# Patient Record
Sex: FEMALE | Race: BLACK OR AFRICAN AMERICAN | Employment: FULL TIME | URBAN - METROPOLITAN AREA
[De-identification: names, ages, dates, MRNs, and addresses within clinical notes are randomized per-mention and may not be internally consistent; named-entity substitution may affect disease eponyms.]

---

## 2019-02-02 ENCOUNTER — ED HISTORICAL/CONVERTED ENCOUNTER (OUTPATIENT)
Dept: OTHER | Age: 29
End: 2019-02-02

## 2019-02-03 ENCOUNTER — OP HISTORICAL/CONVERTED ENCOUNTER (OUTPATIENT)
Dept: OTHER | Age: 29
End: 2019-02-03

## 2020-05-03 ENCOUNTER — ED HISTORICAL/CONVERTED ENCOUNTER (OUTPATIENT)
Dept: OTHER | Age: 30
End: 2020-05-03

## 2020-05-03 ENCOUNTER — IP HISTORICAL/CONVERTED ENCOUNTER (OUTPATIENT)
Dept: OTHER | Age: 30
End: 2020-05-03

## 2020-05-04 ENCOUNTER — IP HISTORICAL/CONVERTED ENCOUNTER (OUTPATIENT)
Dept: OTHER | Age: 30
End: 2020-05-04

## 2020-09-10 VITALS
WEIGHT: 215 LBS | HEART RATE: 82 BPM | BODY MASS INDEX: 33.74 KG/M2 | SYSTOLIC BLOOD PRESSURE: 116 MMHG | HEIGHT: 67 IN | DIASTOLIC BLOOD PRESSURE: 64 MMHG

## 2020-09-10 PROBLEM — O42.919 PRETERM PREMATURE RUPTURE OF MEMBRANES: Status: ACTIVE | Noted: 2020-09-10

## 2020-09-10 RX ORDER — METOCLOPRAMIDE 10 MG/1
10 TABLET ORAL
COMMUNITY
End: 2021-12-23

## 2020-09-10 RX ORDER — FLUCONAZOLE 150 MG/1
150 TABLET ORAL DAILY
COMMUNITY
End: 2021-12-23

## 2020-09-10 RX ORDER — OSELTAMIVIR PHOSPHATE 75 MG/1
CAPSULE ORAL
COMMUNITY
End: 2021-12-23

## 2020-09-10 RX ORDER — TERCONAZOLE 4 MG/G
1 CREAM VAGINAL
COMMUNITY
End: 2021-12-23

## 2020-09-10 RX ORDER — ASPIRIN 81 MG/1
TABLET ORAL DAILY
COMMUNITY
End: 2021-12-23

## 2020-09-10 RX ORDER — HYDROXYPROGESTERONE CAPROATE 250 MG/ML
275 INJECTION SUBCUTANEOUS
COMMUNITY
End: 2021-12-23

## 2020-09-10 RX ORDER — LANOLIN ALCOHOL/MO/W.PET/CERES
CREAM (GRAM) TOPICAL
COMMUNITY
End: 2021-12-23

## 2020-09-10 RX ORDER — FOLIC ACID/MULTIVIT,IRON,MINER 0.4MG-18MG
1 TABLET ORAL DAILY
COMMUNITY
End: 2021-12-23

## 2020-09-10 RX ORDER — METRONIDAZOLE 500 MG/1
TABLET ORAL 3 TIMES DAILY
COMMUNITY
End: 2021-12-23

## 2020-09-10 RX ORDER — NORGESTIMATE AND ETHINYL ESTRADIOL 0.25-0.035
1 KIT ORAL DAILY
COMMUNITY
End: 2021-12-23

## 2020-09-10 RX ORDER — CALCIUM CARBONATE/VITAMIN D3 600 MG-125
TABLET ORAL
COMMUNITY

## 2020-09-10 RX ORDER — IBUPROFEN 800 MG/1
TABLET ORAL
COMMUNITY
End: 2021-12-23

## 2020-09-10 RX ORDER — OXYCODONE AND ACETAMINOPHEN 5; 325 MG/1; MG/1
TABLET ORAL
COMMUNITY
End: 2021-12-23

## 2020-09-10 RX ORDER — IBUPROFEN 600 MG/1
TABLET ORAL
COMMUNITY
End: 2021-12-23

## 2021-10-03 ENCOUNTER — APPOINTMENT (OUTPATIENT)
Dept: ULTRASOUND IMAGING | Age: 31
End: 2021-10-03
Attending: STUDENT IN AN ORGANIZED HEALTH CARE EDUCATION/TRAINING PROGRAM
Payer: COMMERCIAL

## 2021-10-03 ENCOUNTER — HOSPITAL ENCOUNTER (EMERGENCY)
Age: 31
Discharge: HOME | End: 2021-10-03
Attending: STUDENT IN AN ORGANIZED HEALTH CARE EDUCATION/TRAINING PROGRAM
Payer: COMMERCIAL

## 2021-10-03 VITALS
WEIGHT: 230 LBS | TEMPERATURE: 99 F | SYSTOLIC BLOOD PRESSURE: 120 MMHG | HEART RATE: 66 BPM | BODY MASS INDEX: 36.1 KG/M2 | RESPIRATION RATE: 18 BRPM | OXYGEN SATURATION: 100 % | HEIGHT: 67 IN | DIASTOLIC BLOOD PRESSURE: 85 MMHG

## 2021-10-03 DIAGNOSIS — O20.0 THREATENED ABORTION: Primary | ICD-10-CM

## 2021-10-03 LAB
ABO + RH BLD: NORMAL
ANION GAP SERPL CALC-SCNC: 9 MMOL/L (ref 5–15)
APPEARANCE UR: ABNORMAL
BACTERIA URNS QL MICRO: NEGATIVE /HPF
BASOPHILS # BLD: 0.1 K/UL (ref 0–0.1)
BASOPHILS NFR BLD: 1 % (ref 0–1)
BILIRUB UR QL: NEGATIVE
BLOOD GROUP ANTIBODIES SERPL: NEGATIVE
BUN SERPL-MCNC: 9 MG/DL (ref 6–20)
BUN/CREAT SERPL: 14 (ref 12–20)
CA-I BLD-MCNC: 9 MG/DL (ref 8.5–10.1)
CHLORIDE SERPL-SCNC: 108 MMOL/L (ref 97–108)
CO2 SERPL-SCNC: 20 MMOL/L (ref 21–32)
COLOR UR: ABNORMAL
CREAT SERPL-MCNC: 0.63 MG/DL (ref 0.55–1.02)
DIFFERENTIAL METHOD BLD: NORMAL
EOSINOPHIL # BLD: 0.1 K/UL (ref 0–0.4)
EOSINOPHIL NFR BLD: 1 % (ref 0–7)
ERYTHROCYTE [DISTWIDTH] IN BLOOD BY AUTOMATED COUNT: 14.5 % (ref 11.5–14.5)
GLUCOSE SERPL-MCNC: 84 MG/DL (ref 65–100)
GLUCOSE UR STRIP.AUTO-MCNC: NEGATIVE MG/DL
HCG SERPL-ACNC: ABNORMAL MIU/ML (ref 0–6)
HCT VFR BLD AUTO: 36 % (ref 35–47)
HGB BLD-MCNC: 11.6 G/DL (ref 11.5–16)
HGB UR QL STRIP: ABNORMAL
IMM GRANULOCYTES # BLD AUTO: 0 K/UL (ref 0–0.04)
IMM GRANULOCYTES NFR BLD AUTO: 0 % (ref 0–0.5)
KETONES UR QL STRIP.AUTO: NEGATIVE MG/DL
LEUKOCYTE ESTERASE UR QL STRIP.AUTO: NEGATIVE
LYMPHOCYTES # BLD: 3.2 K/UL (ref 0.8–3.5)
LYMPHOCYTES NFR BLD: 34 % (ref 12–49)
MCH RBC QN AUTO: 27.2 PG (ref 26–34)
MCHC RBC AUTO-ENTMCNC: 32.2 G/DL (ref 30–36.5)
MCV RBC AUTO: 84.3 FL (ref 80–99)
MONOCYTES # BLD: 0.6 K/UL (ref 0–1)
MONOCYTES NFR BLD: 6 % (ref 5–13)
MUCOUS THREADS URNS QL MICRO: ABNORMAL /LPF
NEUTS SEG # BLD: 5.4 K/UL (ref 1.8–8)
NEUTS SEG NFR BLD: 58 % (ref 32–75)
NITRITE UR QL STRIP.AUTO: NEGATIVE
NRBC # BLD: 0 K/UL (ref 0–0.01)
NRBC BLD-RTO: 0 PER 100 WBC
PH UR STRIP: 7 [PH] (ref 5–8)
PLATELET # BLD AUTO: 241 K/UL (ref 150–400)
PMV BLD AUTO: 11.2 FL (ref 8.9–12.9)
POTASSIUM SERPL-SCNC: 4 MMOL/L (ref 3.5–5.1)
PROT UR STRIP-MCNC: NEGATIVE MG/DL
RBC # BLD AUTO: 4.27 M/UL (ref 3.8–5.2)
RBC #/AREA URNS HPF: >100 /HPF (ref 0–5)
SODIUM SERPL-SCNC: 137 MMOL/L (ref 136–145)
SP GR UR REFRACTOMETRY: 1.01 (ref 1–1.03)
SPECIMEN EXP DATE BLD: NORMAL
UROBILINOGEN UR QL STRIP.AUTO: 0.1 EU/DL (ref 0.1–1)
WBC # BLD AUTO: 9.4 K/UL (ref 3.6–11)
WBC URNS QL MICRO: ABNORMAL /HPF (ref 0–4)

## 2021-10-03 PROCEDURE — 81001 URINALYSIS AUTO W/SCOPE: CPT

## 2021-10-03 PROCEDURE — 80048 BASIC METABOLIC PNL TOTAL CA: CPT

## 2021-10-03 PROCEDURE — 84702 CHORIONIC GONADOTROPIN TEST: CPT

## 2021-10-03 PROCEDURE — 99283 EMERGENCY DEPT VISIT LOW MDM: CPT

## 2021-10-03 PROCEDURE — 76801 OB US < 14 WKS SINGLE FETUS: CPT

## 2021-10-03 PROCEDURE — 74011250636 HC RX REV CODE- 250/636: Performed by: STUDENT IN AN ORGANIZED HEALTH CARE EDUCATION/TRAINING PROGRAM

## 2021-10-03 PROCEDURE — 85025 COMPLETE CBC W/AUTO DIFF WBC: CPT

## 2021-10-03 PROCEDURE — 86901 BLOOD TYPING SEROLOGIC RH(D): CPT

## 2021-10-03 PROCEDURE — 36415 COLL VENOUS BLD VENIPUNCTURE: CPT

## 2021-10-03 RX ORDER — ACETAMINOPHEN 325 MG/1
650 TABLET ORAL
Qty: 20 TABLET | Refills: 0 | Status: SHIPPED | OUTPATIENT
Start: 2021-10-03 | End: 2021-12-23

## 2021-10-03 RX ADMIN — SODIUM CHLORIDE 1000 ML: 9 INJECTION, SOLUTION INTRAVENOUS at 22:42

## 2021-10-03 NOTE — Clinical Note
[facility] EMERGENCY DEPT  [facility] [place] [address]  [phone]    Work/School Note    Date: 10/3/2021    To Whom It May concern:    Selena Covington was seen and treated today in the emergency room by the following provider(s):  Attending Provider: Pradip Stapleton MD.      Selena Covington is excused from work/school on 10/03/21 and 10/04/21. She is medically clear to return to work/school on 10/5/2021.        Sincerely,          Sebastian Chamberlain MD

## 2021-10-03 NOTE — Clinical Note
[address] [facility] EMERGENCY DEPT  [address] [place] [address] [place] [address]  [phone]    Work/School Note    Date: 10/3/2021    To Whom It May concern:    Anastasiia Austin was seen and treated today in the emergency room by the following provider(s):  Attending Provider: Chika Miller MD.      Anastasiia Austin is excused from work/school on 10/3/2021 through 10/6/2021. She is medically clear to return to work/school on 10/7/2021.         Sincerely,          Selena North MD

## 2021-10-04 NOTE — DISCHARGE INSTRUCTIONS
Thank you! Thank you for allowing me to care for you in the emergency department. I sincerely hope that you are satisfied with your visit today. It is my goal to provide you with excellent care. Below you will find a list of your labs and imaging from your visit today. Should you have any questions regarding these results please do not hesitate to call the emergency department. Labs -     Recent Results (from the past 12 hour(s))   CBC WITH AUTOMATED DIFF    Collection Time: 10/03/21  9:10 PM   Result Value Ref Range    WBC 9.4 3.6 - 11.0 K/uL    RBC 4.27 3.80 - 5.20 M/uL    HGB 11.6 11.5 - 16.0 g/dL    HCT 36.0 35.0 - 47.0 %    MCV 84.3 80.0 - 99.0 FL    MCH 27.2 26.0 - 34.0 PG    MCHC 32.2 30.0 - 36.5 g/dL    RDW 14.5 11.5 - 14.5 %    PLATELET 164 378 - 263 K/uL    MPV 11.2 8.9 - 12.9 FL    NRBC 0.0 0.0  WBC    ABSOLUTE NRBC 0.00 0.00 - 0.01 K/uL    NEUTROPHILS 58 32 - 75 %    LYMPHOCYTES 34 12 - 49 %    MONOCYTES 6 5 - 13 %    EOSINOPHILS 1 0 - 7 %    BASOPHILS 1 0 - 1 %    IMMATURE GRANULOCYTES 0 0 - 0.5 %    ABS. NEUTROPHILS 5.4 1.8 - 8.0 K/UL    ABS. LYMPHOCYTES 3.2 0.8 - 3.5 K/UL    ABS. MONOCYTES 0.6 0.0 - 1.0 K/UL    ABS. EOSINOPHILS 0.1 0.0 - 0.4 K/UL    ABS. BASOPHILS 0.1 0.0 - 0.1 K/UL    ABS. IMM.  GRANS. 0.0 0.00 - 0.04 K/UL    DF AUTOMATED     METABOLIC PANEL, BASIC    Collection Time: 10/03/21  9:10 PM   Result Value Ref Range    Sodium 137 136 - 145 mmol/L    Potassium 4.0 3.5 - 5.1 mmol/L    Chloride 108 97 - 108 mmol/L    CO2 20 (L) 21 - 32 mmol/L    Anion gap 9 5 - 15 mmol/L    Glucose 84 65 - 100 mg/dL    BUN 9 6 - 20 mg/dL    Creatinine 0.63 0.55 - 1.02 mg/dL    BUN/Creatinine ratio 14 12 - 20      GFR est AA >60 >60 ml/min/1.73m2    GFR est non-AA >60 >60 ml/min/1.73m2    Calcium 9.0 8.5 - 10.1 mg/dL   URINALYSIS W/MICROSCOPIC    Collection Time: 10/03/21  9:10 PM   Result Value Ref Range    Color Yellow/Straw      Appearance Turbid (A) Clear      Specific gravity 1.015 1.003 - 1.030      pH (UA) 7.0 5.0 - 8.0      Protein Negative Negative mg/dL    Glucose Negative Negative mg/dL    Ketone Negative Negative mg/dL    Bilirubin Negative Negative      Blood Large (A) Negative      Urobilinogen 0.1 0.1 - 1.0 EU/dL    Nitrites Negative Negative      Leukocyte Esterase Negative Negative      WBC 5-10 0 - 4 /hpf    RBC >100 (H) 0 - 5 /hpf    Bacteria Negative Negative /hpf    Mucus Trace /lpf   BETA HCG, QT    Collection Time: 10/03/21  9:10 PM   Result Value Ref Range    Beta HCG, QT 41,716.0 (H) 0 - 6 mIU/mL   TYPE & SCREEN    Collection Time: 10/03/21  9:10 PM   Result Value Ref Range    Crossmatch Expiration 10/06/2021,2359     ABO/Rh(D) A Positive     Antibody screen Negative        Radiologic Studies -   US PREG UTS < 14 WKS SNGL   Final Result   Single live intrauterine gestation crown-rump length of 7 weeks 2   days. No evidence of implantation hemorrhage. CT Results  (Last 48 hours)      None          CXR Results  (Last 48 hours)      None               If you feel that you have not received excellent quality care or timely care, please ask to speak to the nurse manager. Please choose us in the future for your continued health care needs. ------------------------------------------------------------------------------------------------------------  The exam and treatment you received in the Emergency Department were for an urgent problem and are not intended as complete care. It is important that you follow-up with a doctor, nurse practitioner, or physician assistant to:  (1) confirm your diagnosis,  (2) re-evaluation of changes in your illness and treatment, and  (3) for ongoing care. If your symptoms become worse or you do not improve as expected and you are unable to reach your usual health care provider, you should return to the Emergency Department. We are available 24 hours a day.      Please take your discharge instructions with you when you go to your follow-up appointment. If you have any problem arranging a follow-up appointment, contact the Emergency Department immediately. If a prescription has been provided, please have it filled as soon as possible to prevent a delay in treatment. Read the entire medication instruction sheet provided to you by the pharmacy. If you have any questions or reservations about taking the medication due to side effects or interactions with other medications, please call your primary care physician or contact the ER to speak with the charge nurse. Make an appointment with your family doctor or the physician you were referred to for follow-up of this visit as instructed on your discharge paperwork, as this is a mandatory follow-up. Return to the ER if you are unable to be seen or if you are unable to be seen in a timely manner. If you have any problem arranging the follow-up visit, contact the Emergency Department immediately. The patient is a 39y Male complaining of headache.

## 2021-10-04 NOTE — ED NOTES
Pt discharged home with instructions to follow up with OBGYN for further evaluation. Pt verbalized understanding and stated she has an appt scheduled for obgyn. Pt ambulates out ED without assistant.

## 2021-10-04 NOTE — ED PROVIDER NOTES
EMERGENCY DEPARTMENT HISTORY AND PHYSICAL EXAM      Date: 10/3/2021  Patient Name: Pavel Sanchez    History of Presenting Illness     Chief Complaint   Patient presents with    Vaginal Bleeding       HPI: Pavel Sanchez, 35 y.o. female with no past medical history, , currently 8 weeks pregnant presenting for vaginal bleeding. Patient was in the shower when she noted bleeding from the vagina. The bleeding was moderate and was described as similar to her menstrual period. It was accompanied by clots. She denies any abdominal cramping. No lightheadedness or dizziness. No nausea or vomiting. No chest pain or shortness of breath. Has not had abortions in the past.  She only takes prenatal vitamins. No other complaints. Does not take any blood thinners. No trauma. Unknown blood type. PCP: None    Current Facility-Administered Medications   Medication Dose Route Frequency Provider Last Rate Last Admin    sodium chloride 0.9 % bolus infusion 1,000 mL  1,000 mL IntraVENous ONCE Pacheco Rudd MD 1,000 mL/hr at 10/03/21 2242 1,000 mL at 10/03/21 2242     Current Outpatient Medications   Medication Sig Dispense Refill    acetaminophen (TYLENOL) 325 mg tablet Take 2 Tablets by mouth every six (6) hours as needed for Pain. 20 Tablet 0    aspirin delayed-release 81 mg tablet Take  by mouth daily.  calcium-cholecalciferol, d3, (CALCIUM 600 + D) 600-125 mg-unit tab Take  by mouth.  ferrous sulfate 325 mg (65 mg iron) tablet Take  by mouth Daily (before breakfast).  fluconazole (DIFLUCAN) 150 mg tablet Take 150 mg by mouth daily. FDA advises cautious prescribing of oral fluconazole in pregnancy.  ibuprofen (MOTRIN) 600 mg tablet Take  by mouth every six (6) hours as needed for Pain.  ibuprofen (MOTRIN) 800 mg tablet Take  by mouth.  hydroxyprogesterone, PF, (Pat, PF,) 275 mg/1.1 mL atIn subcutaneous injection 275 mg by SubCUTAneous route every seven (7) days.       metoclopramide HCl (REGLAN) 10 mg tablet Take 10 mg by mouth Before breakfast, lunch, dinner and at bedtime.  metroNIDAZOLE (FLAGYL) 500 mg tablet Take  by mouth three (3) times daily.  oseltamivir (TAMIFLU) 75 mg capsule Take  by mouth.  oxyCODONE-acetaminophen (PERCOCET) 5-325 mg per tablet Take  by mouth every four (4) hours as needed for Pain.  prenatal vitamin (Prenatal) 28 mg iron- 800 mcg tab tablet Take 1 Tab by mouth daily.  norgestimate-ethinyl estradioL (Sprintec, 28,) 0.25-35 mg-mcg tab Take 1 Tab by mouth daily.  terconazole (TERAZOL 7) 0.4 % vaginal cream Insert 1 Applicator into vagina nightly. Medical History   I reviewed the medical, surgical, family, and social history, as well as allergies:    Past Medical History:  No past medical history on file. Past Surgical History:  No past surgical history on file. Family History:  No family history on file. Social History:  Social History     Tobacco Use    Smoking status: Not on file   Substance Use Topics    Alcohol use: Not on file    Drug use: Not on file       Allergies:  No Known Allergies    Review of Systems     Review of Systems   Constitutional: Negative for chills and fever. HENT: Negative for congestion, rhinorrhea and sore throat. Eyes: Negative. Respiratory: Negative for cough and shortness of breath. Cardiovascular: Negative for chest pain and leg swelling. Gastrointestinal: Negative for abdominal pain and vomiting. Endocrine: Negative. Genitourinary: Positive for vaginal bleeding. Negative for dysuria and hematuria. Musculoskeletal: Negative for back pain and myalgias. Skin: Negative for rash and wound. Allergic/Immunologic: Negative. Neurological: Negative for light-headedness and numbness. Hematological: Negative. Psychiatric/Behavioral: Negative for agitation and confusion.          Physical Exam and Vital Signs   Vital Signs - Reviewed the patient's vital signs. Patient Vitals for the past 12 hrs:   Temp Pulse Resp BP SpO2   10/03/21 2053 98.3 °F (36.8 °C) 85 18 120/85 100 %       Physical Exam:    GENERAL: awake, alert, cooperative, not in distress  HEENT:  * Pupils equal, EOMI  * Head atraumatic  CV:  * regular rhythm  * warm and perfused extremities bilaterally  PULMONARY: Good air movement, no wheezes or crackles  ABDOMEN: soft, not distended, no guarding, not tenderness to palpation  : No suprapubic tenderness  PELVIC EXAM  * External genitalia unremarkable without any rashes, abscesses, or lesions  * Speculum exam showed large clots that were cleared  * Vaginal wall mucosa is unremarkable without any lacerations. * Cervix visualized and is closed  * No cervical motion tenderness, adnexal tenderness or any masses  EXTREMITIES/BACK: warm and perfused, no tenderness, no edema  SKIN: no rashes or signs of trauma  NEURO:  * Speech clear  * Moves U&LE to command      Medical Decision Making and ED Course   - I am the first and primary provider for this patient and am the primary provider of record. - I reviewed the vital signs, available nursing notes, past medical history, past surgical history, family history and social history. - Initial assessment performed. The patients presenting problems have been discussed, and the staff are in agreement with the care plan formulated and outlined with them. I have encouraged them to ask questions as they arise throughout their visit. - Available medical records, nursing notes, old EKGs, and EMS run sheets (if patient was EMS transported) were reviewed    MDM:   Patient is a 35 y.o. female presenting for vaginal bleeding at 8 weeks gestation. Vitals reveal no abnormality and physical exam reveals large clots in the vagina with ectocervix.  Based on the history, physical exam, risk factors, and vitals signs, I favor the following differential diagnoses: Threatened , missed , completed , incomplete , anemia. We will test ABO for the need for RhoGam.  Will do ultrasound. Results     Labs:  Recent Results (from the past 12 hour(s))   CBC WITH AUTOMATED DIFF    Collection Time: 10/03/21  9:10 PM   Result Value Ref Range    WBC 9.4 3.6 - 11.0 K/uL    RBC 4.27 3.80 - 5.20 M/uL    HGB 11.6 11.5 - 16.0 g/dL    HCT 36.0 35.0 - 47.0 %    MCV 84.3 80.0 - 99.0 FL    MCH 27.2 26.0 - 34.0 PG    MCHC 32.2 30.0 - 36.5 g/dL    RDW 14.5 11.5 - 14.5 %    PLATELET 088 767 - 952 K/uL    MPV 11.2 8.9 - 12.9 FL    NRBC 0.0 0.0  WBC    ABSOLUTE NRBC 0.00 0.00 - 0.01 K/uL    NEUTROPHILS 58 32 - 75 %    LYMPHOCYTES 34 12 - 49 %    MONOCYTES 6 5 - 13 %    EOSINOPHILS 1 0 - 7 %    BASOPHILS 1 0 - 1 %    IMMATURE GRANULOCYTES 0 0 - 0.5 %    ABS. NEUTROPHILS 5.4 1.8 - 8.0 K/UL    ABS. LYMPHOCYTES 3.2 0.8 - 3.5 K/UL    ABS. MONOCYTES 0.6 0.0 - 1.0 K/UL    ABS. EOSINOPHILS 0.1 0.0 - 0.4 K/UL    ABS. BASOPHILS 0.1 0.0 - 0.1 K/UL    ABS. IMM.  GRANS. 0.0 0.00 - 0.04 K/UL    DF AUTOMATED     METABOLIC PANEL, BASIC    Collection Time: 10/03/21  9:10 PM   Result Value Ref Range    Sodium 137 136 - 145 mmol/L    Potassium 4.0 3.5 - 5.1 mmol/L    Chloride 108 97 - 108 mmol/L    CO2 20 (L) 21 - 32 mmol/L    Anion gap 9 5 - 15 mmol/L    Glucose 84 65 - 100 mg/dL    BUN 9 6 - 20 mg/dL    Creatinine 0.63 0.55 - 1.02 mg/dL    BUN/Creatinine ratio 14 12 - 20      GFR est AA >60 >60 ml/min/1.73m2    GFR est non-AA >60 >60 ml/min/1.73m2    Calcium 9.0 8.5 - 10.1 mg/dL   URINALYSIS W/MICROSCOPIC    Collection Time: 10/03/21  9:10 PM   Result Value Ref Range    Color Yellow/Straw      Appearance Turbid (A) Clear      Specific gravity 1.015 1.003 - 1.030      pH (UA) 7.0 5.0 - 8.0      Protein Negative Negative mg/dL    Glucose Negative Negative mg/dL    Ketone Negative Negative mg/dL    Bilirubin Negative Negative      Blood Large (A) Negative      Urobilinogen 0.1 0.1 - 1.0 EU/dL    Nitrites Negative Negative      Leukocyte Esterase Negative Negative      WBC 5-10 0 - 4 /hpf    RBC >100 (H) 0 - 5 /hpf    Bacteria Negative Negative /hpf    Mucus Trace /lpf   BETA HCG, QT    Collection Time: 10/03/21  9:10 PM   Result Value Ref Range    Beta HCG, QT 41,716.0 (H) 0 - 6 mIU/mL   TYPE & SCREEN    Collection Time: 10/03/21  9:10 PM   Result Value Ref Range    Crossmatch Expiration 10/06/2021,2359     ABO/Rh(D) A Positive     Antibody screen Negative        Radiologic Studies:  CT Results  (Last 48 hours)    None        CXR Results  (Last 48 hours)    None          Medications ordered:  Medications   sodium chloride 0.9 % bolus infusion 1,000 mL (1,000 mL IntraVENous New Bag 10/3/21 2242)        ED Course     ED Course:     ED Course as of Oct 03 2321   Sun Oct 03, 2021   2221 CBC does not show any evidence of acute process. Leukocytosis not present to suggest infection. Hemoglobin at baseline without evidence of acute anemia. Platelet count is normal.      [SS]   4638 Patient type A+, no need for Rhogam    [SS]   2256 Electrolytes are within range. Creatinine is not elevated more than baseline range making NOEL unlikely. BHCG is within normal range for current pregnancy dates. [SS]   2317 Urinalysis is within normal limits without any evidence of UTI: no bacteria, nitrites, or leukocyte esterase. No ketonemia to suggest dehydration. No glucosuria. [SS]   3217 Ultrasound shows a normal-appearing fetus with a normal heart rate. Since the cervix is closed and the patient is having bleeding, picture of threatened . Will discharge the patient with bedrest and close follow-up with OB/GYN.    [SS]      ED Course User Index  [SS] Kyle Orta MD         Final Disposition     Disposition: Condition stable  DC- Adult Discharges: All of the diagnostic tests were reviewed and questions answered. Diagnosis, care plan and treatment options were discussed. The patient understands the instructions and will follow up as directed.  The patients results have been reviewed with them. They have been counseled regarding their diagnosis. The patient verbally convey understanding and agreement of the signs, symptoms, diagnosis, treatment and prognosis and additionally agrees to follow up as recommended with their PCP in 24 - 48 hours. They also agree with the care-plan and convey that all of their questions have been answered. I have also put together some discharge instructions for them that include: 1) educational information regarding their diagnosis, 2) how to care for their diagnosis at home, as well a 3) list of reasons why they would want to return to the ED prior to their follow-up appointment, should their condition change. DISCHARGE PLAN:  1. Current Discharge Medication List      START taking these medications    Details   acetaminophen (TYLENOL) 325 mg tablet Take 2 Tablets by mouth every six (6) hours as needed for Pain. Qty: 20 Tablet, Refills: 0         CONTINUE these medications which have NOT CHANGED    Details   aspirin delayed-release 81 mg tablet Take  by mouth daily. calcium-cholecalciferol, d3, (CALCIUM 600 + D) 600-125 mg-unit tab Take  by mouth. ferrous sulfate 325 mg (65 mg iron) tablet Take  by mouth Daily (before breakfast). fluconazole (DIFLUCAN) 150 mg tablet Take 150 mg by mouth daily. FDA advises cautious prescribing of oral fluconazole in pregnancy. !! ibuprofen (MOTRIN) 600 mg tablet Take  by mouth every six (6) hours as needed for Pain. !! ibuprofen (MOTRIN) 800 mg tablet Take  by mouth. hydroxyprogesterone, PF, (Pat, PF,) 275 mg/1.1 mL atIn subcutaneous injection 275 mg by SubCUTAneous route every seven (7) days. metoclopramide HCl (REGLAN) 10 mg tablet Take 10 mg by mouth Before breakfast, lunch, dinner and at bedtime. metroNIDAZOLE (FLAGYL) 500 mg tablet Take  by mouth three (3) times daily. oseltamivir (TAMIFLU) 75 mg capsule Take  by mouth. oxyCODONE-acetaminophen (PERCOCET) 5-325 mg per tablet Take  by mouth every four (4) hours as needed for Pain.      prenatal vitamin (Prenatal) 28 mg iron- 800 mcg tab tablet Take 1 Tab by mouth daily. norgestimate-ethinyl estradioL (Sprintec, 28,) 0.25-35 mg-mcg tab Take 1 Tab by mouth daily. terconazole (TERAZOL 7) 0.4 % vaginal cream Insert 1 Applicator into vagina nightly. !! - Potential duplicate medications found. Please discuss with provider. 2.   Follow-up Information     Follow up With Specialties Details Why Contact Info    Alma Tabor MD Obstetrics & Gynecology Schedule an appointment as soon as possible for a visit in 1 day  [address] [place] [address]  [phone]      [address] [place] [address] DEPT Emergency Medicine Go to  If symptoms worsen [address] [facility] [address] [phone]        3. Return to ED if worse   4. Current Discharge Medication List      START taking these medications    Details   acetaminophen (TYLENOL) 325 mg tablet Take 2 Tablets by mouth every six (6) hours as needed for Pain. Qty: 20 Tablet, Refills: 0  Start date: 10/3/2021               Diagnosis     Clinical Impression:   1. Threatened         Attestations:    Dai Coronado MD    Please note that this dictation was completed with [facility], the computer voice recognition software. Quite often unanticipated grammatical, syntax, homophones, and other interpretive errors are inadvertently transcribed by the computer software. Please disregard these errors. Please excuse any errors that have escaped final proofreading. Thank you.

## 2021-10-12 ENCOUNTER — INITIAL PRENATAL (OUTPATIENT)
Dept: OBGYN CLINIC | Age: 31
End: 2021-10-12
Payer: COMMERCIAL

## 2021-10-12 VITALS
BODY MASS INDEX: 38.14 KG/M2 | HEART RATE: 72 BPM | WEIGHT: 243 LBS | DIASTOLIC BLOOD PRESSURE: 78 MMHG | SYSTOLIC BLOOD PRESSURE: 130 MMHG | OXYGEN SATURATION: 99 % | RESPIRATION RATE: 16 BRPM | HEIGHT: 67 IN

## 2021-10-12 DIAGNOSIS — N88.3 INCOMPETENT CERVIX: ICD-10-CM

## 2021-10-12 DIAGNOSIS — Z01.419 GYNECOLOGIC EXAM NORMAL: ICD-10-CM

## 2021-10-12 DIAGNOSIS — Z3A.08 8 WEEKS GESTATION OF PREGNANCY: ICD-10-CM

## 2021-10-12 DIAGNOSIS — N76.0 VAGINITIS AND VULVOVAGINITIS: ICD-10-CM

## 2021-10-12 DIAGNOSIS — O09.90 HIGH RISK PREGNANCY, ANTEPARTUM: Primary | ICD-10-CM

## 2021-10-12 DIAGNOSIS — O20.9 FIRST-TRIMESTER BLEEDING: ICD-10-CM

## 2021-10-12 PROCEDURE — 0500F INITIAL PRENATAL CARE VISIT: CPT | Performed by: OBSTETRICS & GYNECOLOGY

## 2021-10-12 PROCEDURE — 76801 OB US < 14 WKS SINGLE FETUS: CPT | Performed by: OBSTETRICS & GYNECOLOGY

## 2021-10-12 RX ORDER — TERCONAZOLE 4 MG/G
1 CREAM VAGINAL
Qty: 45 G | Refills: 0 | Status: SHIPPED | OUTPATIENT
Start: 2021-10-12 | End: 2021-10-25 | Stop reason: SDUPTHER

## 2021-10-12 NOTE — LETTER
NOTIFICATION RETURN TO WORK / SCHOOL    10/12/2021     Ms. Lazo  [address] [place] [address],[address] [address]      To Whom It May Concern:    Violet is currently under the care of [address] Patient is not to lift over 20 lbs during her pregnancy. Patient only allowed to work 40 hours per week. She will return to work/school on: 10/12/2021    If there are questions or concerns please have the patient contact our office.         Sincerely,      Wayne Weeks MD

## 2021-10-12 NOTE — PATIENT INSTRUCTIONS
Learning About Pregnancy  Your Care Instructions     Your health in the early weeks of your pregnancy is particularly important for your baby's health. Take good care of yourself. Anything you do that harms your body can also harm your baby. Make sure to go to all of your doctor appointments. Regular checkups will help keep you and your baby healthy. How can you care for yourself at home? Diet    · Eat a balanced diet. Make sure your diet includes plenty of beans, peas, and leafy green vegetables.     · Do not skip meals or go for many hours without eating. If you are nauseated, try to eat a small, healthy snack every 2 to 3 hours.     · Do not eat fish that has a high level of mercury, such as shark, swordfish, or mackerel. Do not eat more than one can of tuna each week.     · Drink plenty of fluids. If you have kidney, heart, or liver disease and have to limit fluids, talk with your doctor before you increase the amount of fluids you drink.     · Cut down on caffeine, such as coffee, tea, and cola.     · Do not drink alcohol, such as beer, wine, or hard liquor.     · Take a multivitamin that contains at least 400 micrograms (mcg) of folic acid to help prevent birth defects. Fortified cereal and whole wheat bread are good additional sources of folic acid.     · Increase the calcium in your diet. Try to drink a quart of skim milk each day. You may also take calcium supplements and choose foods such as cheese and yogurt. Lifestyle    · Make sure you go to your follow-up appointments.     · Get plenty of rest. You may be unusually tired while you are pregnant.     · Get at least 30 minutes of exercise on most days of the week. Walking is a good choice. If you have not exercised in the past, start out slowly. Take several short walks each day.     · Do not smoke. If you need help quitting, talk to your doctor about stop-smoking programs.  These can increase your chances of quitting for good.     · Do not touch cat feces or litter boxes. Also, wash your hands after you handle raw meat, and fully cook all meat before you eat it. Wear gloves when you work in the yard or garden, and wash your hands well when you are done. Cat feces, raw or undercooked meat, and contaminated dirt can cause an infection that may harm your baby or lead to a miscarriage.     · Do not use saunas or hot tubs. Raising your body temperature may harm your baby.     · Avoid chemical fumes, paint fumes, or poisons.     · Do not use illegal drugs, marijuana, or alcohol. Medicines    · Review all of your medicines with your doctor. Some of your routine medicines may need to be changed to protect your baby.     · Use acetaminophen (Tylenol) to relieve minor problems, such as a mild headache or backache or a mild fever with cold symptoms. Do not use nonsteroidal anti-inflammatory drugs (NSAIDs), such as ibuprofen (Advil, Motrin) or naproxen (Aleve), unless your doctor says it is okay.     · Do not take two or more pain medicines at the same time unless the doctor told you to. Many pain medicines have acetaminophen, which is Tylenol. Too much acetaminophen (Tylenol) can be harmful.     · Take your medicines exactly as prescribed. Call your doctor if you think you are having a problem with your medicine. To manage morning sickness    · If you feel sick when you first wake up, try eating a small snack (such as crackers) before you get out of bed. Allow some time to digest the snack, and then get out of bed slowly.     · Do not skip meals or go for long periods without eating. An empty stomach can make nausea worse.     · Eat small, frequent meals instead of three large meals each day.     · Drink plenty of fluids.     · Eat foods that are high in protein but low in fat.     · If you are taking iron supplements, ask your doctor if they are necessary.  Iron can make nausea worse.     · Avoid any smells, such as coffee, that make you feel sick.     · Get lots of rest. Morning sickness may be worse when you are tired. Follow-up care is a key part of your treatment and safety. Be sure to make and go to all appointments, and call your doctor if you are having problems. It's also a good idea to know your test results and keep a list of the medicines you take. Where can you learn more? Go to http://www.gray.com/  Enter Q816 in the search box to learn more about \"Learning About Pregnancy. \"  Current as of: June 16, 2021               Content Version: 13.0  © 6898-1091 [employer], [employer]. Care instructions adapted under license by [employer] (which disclaims liability or warranty for this information). If you have questions about a medical condition or this instruction, always ask your healthcare professional. [address] any warranty or liability for your use of this information.

## 2021-10-13 NOTE — PROGRESS NOTES
Jean Claude Larsen is a 35 y.o. female, , Patient's last menstrual period was 2021., who presents today for the following:  Chief Complaint   Patient presents with    Initial Prenatal Visit        No Known Allergies    Current Outpatient Medications   Medication Sig    terconazole (TERAZOL 7) 0.4 % vaginal cream Insert 1 Applicator into vagina nightly.  prenatal vitamin (Prenatal) 28 mg iron- 800 mcg tab tablet Take 1 Tab by mouth daily.  acetaminophen (TYLENOL) 325 mg tablet Take 2 Tablets by mouth every six (6) hours as needed for Pain. (Patient not taking: Reported on 10/12/2021)    aspirin delayed-release 81 mg tablet Take  by mouth daily. (Patient not taking: Reported on 10/12/2021)    calcium-cholecalciferol, d3, (CALCIUM 600 + D) 600-125 mg-unit tab Take  by mouth. (Patient not taking: Reported on 10/12/2021)    ferrous sulfate 325 mg (65 mg iron) tablet Take  by mouth Daily (before breakfast). (Patient not taking: Reported on 10/12/2021)    fluconazole (DIFLUCAN) 150 mg tablet Take 150 mg by mouth daily. FDA advises cautious prescribing of oral fluconazole in pregnancy. (Patient not taking: Reported on 10/12/2021)    ibuprofen (MOTRIN) 600 mg tablet Take  by mouth every six (6) hours as needed for Pain. (Patient not taking: Reported on 10/12/2021)    ibuprofen (MOTRIN) 800 mg tablet Take  by mouth. (Patient not taking: Reported on 10/12/2021)    hydroxyprogesterone, PF, (Pat, PF,) 275 mg/1.1 mL atIn subcutaneous injection 275 mg by SubCUTAneous route every seven (7) days. (Patient not taking: Reported on 10/12/2021)    metoclopramide HCl (REGLAN) 10 mg tablet Take 10 mg by mouth Before breakfast, lunch, dinner and at bedtime. (Patient not taking: Reported on 10/12/2021)    metroNIDAZOLE (FLAGYL) 500 mg tablet Take  by mouth three (3) times daily. (Patient not taking: Reported on 10/12/2021)    oseltamivir (TAMIFLU) 75 mg capsule Take  by mouth.  (Patient not taking: Reported on 10/12/2021)    oxyCODONE-acetaminophen (PERCOCET) 5-325 mg per tablet Take  by mouth every four (4) hours as needed for Pain. (Patient not taking: Reported on 10/12/2021)    norgestimate-ethinyl estradioL (Sprintec, 28,) 0.25-35 mg-mcg tab Take 1 Tab by mouth daily. (Patient not taking: Reported on 10/12/2021)    terconazole (TERAZOL 7) 0.4 % vaginal cream Insert 1 Applicator into vagina nightly. (Patient not taking: Reported on 10/12/2021)     No current facility-administered medications for this visit. History reviewed. No pertinent past medical history. History reviewed. No pertinent surgical history. History reviewed. No pertinent family history. Social History     Socioeconomic History    Marital status: SINGLE     Spouse name: Not on file    Number of children: Not on file    Years of education: Not on file    Highest education level: Not on file   Occupational History    Not on file   Tobacco Use    Smoking status: Never Smoker    Smokeless tobacco: Never Used   Substance and Sexual Activity    Alcohol use: Never    Drug use: Never    Sexual activity: Yes     Partners: Male     Birth control/protection: None   Other Topics Concern    Not on file   Social History Narrative    Not on file     Social Determinants of Health     Financial Resource Strain:     Difficulty of Paying Living Expenses:    Food Insecurity:     Worried About Running Out of Food in the Last Year:     [address] in the Last Year:    Transportation Needs:     Lack of Transportation (Medical):      Lack of Transportation (Non-Medical):    Physical Activity:     Days of Exercise per Week:     Minutes of Exercise per Session:    Stress:     Feeling of Stress :    Social Connections:     Frequency of Communication with Friends and Family:     Frequency of Social Gatherings with Friends and Family:     Attends [employer] Services:     Active Member of Clubs or Organizations:     Attends Club or Organization Meetings:     Marital Status:    Intimate Partner Violence:     Fear of Current or Ex-Partner:     Emotionally Abused:     Physically Abused:     Sexually Abused:          HPI  New ob visit  Doing well  Hx of incompetent cervix  Seen in the [facility] secondary to vaginal bleeding  Viable IUP on US    Review of Systems   Constitutional: Negative. Respiratory: Negative. Cardiovascular: Negative. Gastrointestinal: Negative. Genitourinary: Negative. Musculoskeletal: Negative. Skin: Negative. Neurological: Negative. Endo/Heme/Allergies: Negative. Psychiatric/Behavioral: Negative. All other systems reviewed and are negative. /78 (BP 1 Location: Right arm, BP Patient Position: Sitting)   Pulse 72   Resp 16   Ht 5' 7\" (1.702 m)   Wt 243 lb (110.2 kg)   LMP 08/08/2021   SpO2 99%   BMI 38.06 kg/m²    OBGyn Exam   PE:  Constitutional: General Appearance: healthy-appearing, well-nourished, well-developed, and well groomed. Psychiatric: Orientation: to time, place, and person. Mood and Affect: normal mood and affect and appropriate and active and alert. Abdomen: Auscultation/Inspection/Palpation: tenderness and mass and non-distended. Hernia: none palpated. Female Genitalia: Vulva: no masses, atrophy, or lesions. Bladder/Urethra: no urethral discharge or mass and normal meatus and bladder non distended. Vagina no tenderness, erythema, cystocele, rectocele, abnormal vaginal discharge, or vesicle(s) or ulcers. Cervix: no discharge or cervical motion tenderness and grossly normal.     Uterus: mobile, non-tender, and no uterine prolapse. Adnexa/Parametria: no adnexal tenderness.      1. High risk pregnancy, antepartum    - HEP B SURFACE AG  - T PALLIDUM SCREEN W/REFLEX  - RUBELLA AB, IGG  - TYPE & SCREEN  - HIV 1/2 AG/AB, 4TH GENERATION,W RFLX CONFIRM  - CBC WITH AUTOMATED DIFF  - HEMOGLOBIN FRACTIONATION  - THYROID CASCADE PROFILE  - CMV AB, IGG  - CMV AB, IGM  - VITAMIN D, 25 HYDROXY    2. Incompetent cervix    - REFERRAL TO MATERNAL FETAL MEDICINE    3. 8 weeks gestation of pregnancy      4. Gynecologic exam normal    - PAP IG, CT-NG-TV, APTIMA HPV AND RFX 90/33,34([id number])    5. Vaginitis and vulvovaginitis    - terconazole (TERAZOL 7) 0.4 % vaginal cream; Insert 1 Applicator into vagina nightly. Dispense: 45 g; Refill: 0    6. First-trimester bleeding    - AMB POC US OB < 14 WKS, 1ST GESTATION        Follow-up and Dispositions    · Return for ob.

## 2021-10-14 LAB
25(OH)D3+25(OH)D2 SERPL-MCNC: 10 NG/ML (ref 30–100)
ABO GROUP BLD: NORMAL
BASOPHILS # BLD AUTO: 0 X10E3/UL (ref 0–0.2)
BASOPHILS NFR BLD AUTO: 0 %
BLD GP AB SCN SERPL QL: NEGATIVE
CMV IGG SERPL IA-ACNC: 5.7 U/ML (ref 0–0.59)
CMV IGM SERPL IA-ACNC: <30 AU/ML (ref 0–29.9)
EOSINOPHIL # BLD AUTO: 0.1 X10E3/UL (ref 0–0.4)
EOSINOPHIL NFR BLD AUTO: 1 %
ERYTHROCYTE [DISTWIDTH] IN BLOOD BY AUTOMATED COUNT: 13.9 % (ref 11.7–15.4)
HBV SURFACE AG SERPL QL IA: NEGATIVE
HCT VFR BLD AUTO: 34.7 % (ref 34–46.6)
HGB A MFR BLD ELPH: 97.7 % (ref 96.4–98.8)
HGB A2 MFR BLD ELPH: 2.3 % (ref 1.8–3.2)
HGB BLD-MCNC: 11.2 G/DL (ref 11.1–15.9)
HGB F MFR BLD ELPH: 0 % (ref 0–2)
HGB FRACT BLD-IMP: NORMAL
HGB S MFR BLD ELPH: 0 %
HIV 1+2 AB+HIV1 P24 AG SERPL QL IA: NON REACTIVE
IMM GRANULOCYTES # BLD AUTO: 0 X10E3/UL (ref 0–0.1)
IMM GRANULOCYTES NFR BLD AUTO: 0 %
LYMPHOCYTES # BLD AUTO: 3.3 X10E3/UL (ref 0.7–3.1)
LYMPHOCYTES NFR BLD AUTO: 31 %
MCH RBC QN AUTO: 27.3 PG (ref 26.6–33)
MCHC RBC AUTO-ENTMCNC: 32.3 G/DL (ref 31.5–35.7)
MCV RBC AUTO: 84 FL (ref 79–97)
MONOCYTES # BLD AUTO: 0.7 X10E3/UL (ref 0.1–0.9)
MONOCYTES NFR BLD AUTO: 6 %
NEUTROPHILS # BLD AUTO: 6.6 X10E3/UL (ref 1.4–7)
NEUTROPHILS NFR BLD AUTO: 62 %
PLATELET # BLD AUTO: 232 X10E3/UL (ref 150–450)
RBC # BLD AUTO: 4.11 X10E6/UL (ref 3.77–5.28)
RH BLD: POSITIVE
RUBV IGG SERPL IA-ACNC: 3.15 INDEX
TREPONEMA PALLIDUM IGG+IGM AB [PRESENCE] IN SERUM OR PLASMA BY IMMUNOASSAY: NON REACTIVE
TSH SERPL DL<=0.005 MIU/L-ACNC: 1.35 UIU/ML (ref 0.45–4.5)
WBC # BLD AUTO: 10.7 X10E3/UL (ref 3.4–10.8)

## 2021-10-17 LAB
C TRACH RRNA CVX QL NAA+PROBE: NEGATIVE
CYTOLOGIST CVX/VAG CYTO: NORMAL
CYTOLOGY CVX/VAG DOC CYTO: NORMAL
CYTOLOGY CVX/VAG DOC THIN PREP: NORMAL
DX ICD CODE: NORMAL
HPV I/H RISK 4 DNA CVX QL PROBE+SIG AMP: NEGATIVE
Lab: NORMAL
N GONORRHOEA RRNA CVX QL NAA+PROBE: NEGATIVE
OTHER STN SPEC: NORMAL
STAT OF ADQ CVX/VAG CYTO-IMP: NORMAL
T VAGINALIS RRNA SPEC QL NAA+PROBE: NEGATIVE

## 2021-10-25 ENCOUNTER — TELEPHONE (OUTPATIENT)
Dept: OBGYN CLINIC | Age: 31
End: 2021-10-25

## 2021-10-25 DIAGNOSIS — N76.0 VAGINITIS AND VULVOVAGINITIS: ICD-10-CM

## 2021-10-25 RX ORDER — TERCONAZOLE 4 MG/G
1 CREAM VAGINAL
Qty: 45 G | Refills: 0 | Status: SHIPPED | OUTPATIENT
Start: 2021-10-25 | End: 2021-12-23

## 2021-10-25 NOTE — TELEPHONE ENCOUNTER
Patient called back stating [facility] told her they never received a prescription from Dr Jazmin Butler for her vaginal cream.  Advised her it shows on our end the prescription was received on 10/12/21. Prescription resubmitted.

## 2021-10-29 ENCOUNTER — APPOINTMENT (OUTPATIENT)
Dept: ULTRASOUND IMAGING | Age: 31
End: 2021-10-29
Attending: STUDENT IN AN ORGANIZED HEALTH CARE EDUCATION/TRAINING PROGRAM
Payer: COMMERCIAL

## 2021-10-29 ENCOUNTER — HOSPITAL ENCOUNTER (EMERGENCY)
Age: 31
Discharge: HOME | End: 2021-10-29
Attending: STUDENT IN AN ORGANIZED HEALTH CARE EDUCATION/TRAINING PROGRAM
Payer: COMMERCIAL

## 2021-10-29 VITALS
WEIGHT: 245 LBS | OXYGEN SATURATION: 100 % | BODY MASS INDEX: 39.37 KG/M2 | HEIGHT: 66 IN | SYSTOLIC BLOOD PRESSURE: 122 MMHG | TEMPERATURE: 98 F | DIASTOLIC BLOOD PRESSURE: 77 MMHG | HEART RATE: 77 BPM | RESPIRATION RATE: 12 BRPM

## 2021-10-29 DIAGNOSIS — O20.0 THREATENED MISCARRIAGE: ICD-10-CM

## 2021-10-29 DIAGNOSIS — N93.9 VAGINAL BLEEDING: Primary | ICD-10-CM

## 2021-10-29 LAB
ABO + RH BLD: NORMAL
ALBUMIN SERPL-MCNC: 3.3 G/DL (ref 3.5–5)
ALBUMIN/GLOB SERPL: 0.8 {RATIO} (ref 1.1–2.2)
ALP SERPL-CCNC: 60 U/L (ref 45–117)
ALT SERPL-CCNC: 18 U/L (ref 12–78)
ANION GAP SERPL CALC-SCNC: 7 MMOL/L (ref 5–15)
APPEARANCE UR: CLEAR
AST SERPL W P-5'-P-CCNC: 13 U/L (ref 15–37)
BACTERIA URNS QL MICRO: NEGATIVE /HPF
BASOPHILS # BLD: 0 K/UL (ref 0–0.1)
BASOPHILS NFR BLD: 0 % (ref 0–1)
BILIRUB SERPL-MCNC: 0.2 MG/DL (ref 0.2–1)
BILIRUB UR QL: NEGATIVE
BLOOD GROUP ANTIBODIES SERPL: NEGATIVE
BUN SERPL-MCNC: 10 MG/DL (ref 6–20)
BUN/CREAT SERPL: 16 (ref 12–20)
CA-I BLD-MCNC: 9.1 MG/DL (ref 8.5–10.1)
CHLORIDE SERPL-SCNC: 106 MMOL/L (ref 97–108)
CO2 SERPL-SCNC: 23 MMOL/L (ref 21–32)
COLOR UR: ABNORMAL
CREAT SERPL-MCNC: 0.62 MG/DL (ref 0.55–1.02)
DIFFERENTIAL METHOD BLD: NORMAL
EOSINOPHIL # BLD: 0.1 K/UL (ref 0–0.4)
EOSINOPHIL NFR BLD: 1 % (ref 0–7)
ERYTHROCYTE [DISTWIDTH] IN BLOOD BY AUTOMATED COUNT: 14.5 % (ref 11.5–14.5)
GLOBULIN SER CALC-MCNC: 4.1 G/DL (ref 2–4)
GLUCOSE SERPL-MCNC: 107 MG/DL (ref 65–100)
GLUCOSE UR STRIP.AUTO-MCNC: NEGATIVE MG/DL
HCG SERPL-ACNC: ABNORMAL MIU/ML (ref 0–6)
HCT VFR BLD AUTO: 35.2 % (ref 35–47)
HGB BLD-MCNC: 11.6 G/DL (ref 11.5–16)
HGB UR QL STRIP: ABNORMAL
IMM GRANULOCYTES # BLD AUTO: 0 K/UL (ref 0–0.04)
IMM GRANULOCYTES NFR BLD AUTO: 0 % (ref 0–0.5)
KETONES UR QL STRIP.AUTO: NEGATIVE MG/DL
LEUKOCYTE ESTERASE UR QL STRIP.AUTO: NEGATIVE
LYMPHOCYTES # BLD: 2.3 K/UL (ref 0.8–3.5)
LYMPHOCYTES NFR BLD: 25 % (ref 12–49)
MCH RBC QN AUTO: 27.3 PG (ref 26–34)
MCHC RBC AUTO-ENTMCNC: 33 G/DL (ref 30–36.5)
MCV RBC AUTO: 82.8 FL (ref 80–99)
MONOCYTES # BLD: 0.7 K/UL (ref 0–1)
MONOCYTES NFR BLD: 7 % (ref 5–13)
MUCOUS THREADS URNS QL MICRO: ABNORMAL /LPF
NEUTS SEG # BLD: 5.9 K/UL (ref 1.8–8)
NEUTS SEG NFR BLD: 67 % (ref 32–75)
NITRITE UR QL STRIP.AUTO: NEGATIVE
NRBC # BLD: 0 K/UL (ref 0–0.01)
NRBC BLD-RTO: 0 PER 100 WBC
PH UR STRIP: 7 [PH] (ref 5–8)
PLATELET # BLD AUTO: 228 K/UL (ref 150–400)
PMV BLD AUTO: 10.9 FL (ref 8.9–12.9)
POTASSIUM SERPL-SCNC: 3.8 MMOL/L (ref 3.5–5.1)
PROT SERPL-MCNC: 7.4 G/DL (ref 6.4–8.2)
PROT UR STRIP-MCNC: NEGATIVE MG/DL
RBC # BLD AUTO: 4.25 M/UL (ref 3.8–5.2)
RBC #/AREA URNS HPF: ABNORMAL /HPF (ref 0–5)
SODIUM SERPL-SCNC: 136 MMOL/L (ref 136–145)
SP GR UR REFRACTOMETRY: 1.01 (ref 1–1.03)
SPECIMEN EXP DATE BLD: NORMAL
UA: UC IF INDICATED,UAUC: ABNORMAL
UROBILINOGEN UR QL STRIP.AUTO: 0.1 EU/DL (ref 0.1–1)
WBC # BLD AUTO: 9 K/UL (ref 3.6–11)
WBC URNS QL MICRO: ABNORMAL /HPF (ref 0–4)

## 2021-10-29 PROCEDURE — 76815 OB US LIMITED FETUS(S): CPT

## 2021-10-29 PROCEDURE — 81001 URINALYSIS AUTO W/SCOPE: CPT

## 2021-10-29 PROCEDURE — 85025 COMPLETE CBC W/AUTO DIFF WBC: CPT

## 2021-10-29 PROCEDURE — 99283 EMERGENCY DEPT VISIT LOW MDM: CPT

## 2021-10-29 PROCEDURE — 36415 COLL VENOUS BLD VENIPUNCTURE: CPT

## 2021-10-29 PROCEDURE — 87086 URINE CULTURE/COLONY COUNT: CPT

## 2021-10-29 PROCEDURE — 86901 BLOOD TYPING SEROLOGIC RH(D): CPT

## 2021-10-29 PROCEDURE — 80053 COMPREHEN METABOLIC PANEL: CPT

## 2021-10-29 PROCEDURE — 84702 CHORIONIC GONADOTROPIN TEST: CPT

## 2021-10-29 NOTE — LETTER
[address] [place] [address] EMERGENCY DEPT  [facility] [place] [address]  [phone]    Work/School Note    Date: 10/29/2021    To Whom It May concern:    Antwan Monsalve was seen and treated today in the emergency room by the following provider(s):  Nurse Practitioner: Douglas Bautista NP. Antwan Monsalve may return to work on Karthik 10/31/2021.     Sincerely,          Briseyda Ulloa

## 2021-10-29 NOTE — ED PROVIDER NOTES
EMERGENCY DEPARTMENT HISTORY AND PHYSICAL EXAM      Date: 10/29/2021  Patient Name: Anastasiia Austin      History of Presenting Illness     Chief Complaint   Patient presents with    Threatened Miscarriage       History Provided By: Patient    HPI: Anastasiia Austin, 35 y.o. female with no chronic PMH of note presents emerged department for evaluation of vaginal bleeding. Bleeding started earlier today when she woke up around approximately 1 PM.  Spotting, light-colored blood, no clots. There is no associated abdominal pain, pelvic pain, nausea, vomiting, dysuria, or hematuria. There is no preceding vaginal discharge. There are no other complaints, changes, or physical findings at this time. PCP: None    Current Outpatient Medications   Medication Sig Dispense Refill    terconazole (TERAZOL 7) 0.4 % vaginal cream Insert 1 Applicator into vagina nightly. 45 g 0    acetaminophen (TYLENOL) 325 mg tablet Take 2 Tablets by mouth every six (6) hours as needed for Pain. (Patient not taking: Reported on 10/12/2021) 20 Tablet 0    aspirin delayed-release 81 mg tablet Take  by mouth daily. (Patient not taking: Reported on 10/12/2021)      calcium-cholecalciferol, d3, (CALCIUM 600 + D) 600-125 mg-unit tab Take  by mouth. (Patient not taking: Reported on 10/12/2021)      ferrous sulfate 325 mg (65 mg iron) tablet Take  by mouth Daily (before breakfast). (Patient not taking: Reported on 10/12/2021)      fluconazole (DIFLUCAN) 150 mg tablet Take 150 mg by mouth daily. FDA advises cautious prescribing of oral fluconazole in pregnancy. (Patient not taking: Reported on 10/12/2021)      ibuprofen (MOTRIN) 600 mg tablet Take  by mouth every six (6) hours as needed for Pain. (Patient not taking: Reported on 10/12/2021)      ibuprofen (MOTRIN) 800 mg tablet Take  by mouth.  (Patient not taking: Reported on 10/12/2021)      hydroxyprogesterone, PF, (Pat, PF,) 275 mg/1.1 mL atIn subcutaneous injection 275 mg by SubCUTAneous route every seven (7) days. (Patient not taking: Reported on 10/12/2021)      metoclopramide HCl (REGLAN) 10 mg tablet Take 10 mg by mouth Before breakfast, lunch, dinner and at bedtime. (Patient not taking: Reported on 10/12/2021)      metroNIDAZOLE (FLAGYL) 500 mg tablet Take  by mouth three (3) times daily. (Patient not taking: Reported on 10/12/2021)      oseltamivir (TAMIFLU) 75 mg capsule Take  by mouth. (Patient not taking: Reported on 10/12/2021)      oxyCODONE-acetaminophen (PERCOCET) 5-325 mg per tablet Take  by mouth every four (4) hours as needed for Pain. (Patient not taking: Reported on 10/12/2021)      prenatal vitamin (Prenatal) 28 mg iron- 800 mcg tab tablet Take 1 Tab by mouth daily.  norgestimate-ethinyl estradioL (Sprintec, 28,) 0.25-35 mg-mcg tab Take 1 Tab by mouth daily. (Patient not taking: Reported on 10/12/2021)      terconazole (TERAZOL 7) 0.4 % vaginal cream Insert 1 Applicator into vagina nightly. (Patient not taking: Reported on 10/12/2021)         Past History     Past Medical History:  History reviewed. No pertinent past medical history. Past Surgical History:  History reviewed. No pertinent surgical history. Family History:  History reviewed. No pertinent family history. Social History:  Social History     Tobacco Use    Smoking status: Never Smoker    Smokeless tobacco: Never Used   Vaping Use    Vaping Use: Never used   Substance Use Topics    Alcohol use: Never    Drug use: Never       Allergies:  No Known Allergies      Review of Systems     Review of Systems   Constitutional: Negative for activity change and fever. HENT: Negative for congestion and facial swelling. Eyes: Negative for discharge and visual disturbance. Respiratory: Negative for chest tightness and shortness of breath. Cardiovascular: Negative for chest pain and leg swelling. Gastrointestinal: Negative for abdominal pain and vomiting.    Genitourinary: Positive for vaginal bleeding. Negative for dysuria and flank pain. Musculoskeletal: Negative for back pain and gait problem. Skin: Negative for rash and wound. Neurological: Negative for dizziness and weakness. Physical Exam     Physical Exam  Vitals and nursing note reviewed. Exam conducted with a chaperone present. Constitutional:       General: She is not in acute distress. Appearance: Normal appearance. She is not ill-appearing, toxic-appearing or diaphoretic. HENT:      Head: Normocephalic and atraumatic. Mouth/Throat:      Mouth: Mucous membranes are moist.      Pharynx: Oropharynx is clear. Eyes:      Extraocular Movements: Extraocular movements intact. Conjunctiva/sclera: Conjunctivae normal.   Cardiovascular:      Rate and Rhythm: Normal rate and regular rhythm. Pulmonary:      Effort: Pulmonary effort is normal.      Breath sounds: Normal breath sounds. Abdominal:      General: Abdomen is flat. Palpations: Abdomen is soft. Tenderness: There is no abdominal tenderness. Genitourinary:     General: Normal vulva. Exam position: Supine. Labia:         Right: No rash. Left: No rash. Vagina: Normal.      Cervix: Normal.   Musculoskeletal:         General: No tenderness or deformity. Cervical back: Normal range of motion and neck supple. Skin:     General: Skin is warm and dry. Neurological:      General: No focal deficit present. Mental Status: She is alert and oriented to person, place, and time.          Lab and Diagnostic Study Results     Labs -     Recent Results (from the past 12 hour(s))   CBC WITH AUTOMATED DIFF    Collection Time: 10/29/21  3:50 PM   Result Value Ref Range    WBC 9.0 3.6 - 11.0 K/uL    RBC 4.25 3.80 - 5.20 M/uL    HGB 11.6 11.5 - 16.0 g/dL    HCT 35.2 35.0 - 47.0 %    MCV 82.8 80.0 - 99.0 FL    MCH 27.3 26.0 - 34.0 PG    MCHC 33.0 30.0 - 36.5 g/dL    RDW 14.5 11.5 - 14.5 %    PLATELET 049 872 - 967 K/uL    MPV 10.9 8.9 - 12.9 FL    NRBC 0.0 0.0  WBC    ABSOLUTE NRBC 0.00 0.00 - 0.01 K/uL    NEUTROPHILS 67 32 - 75 %    LYMPHOCYTES 25 12 - 49 %    MONOCYTES 7 5 - 13 %    EOSINOPHILS 1 0 - 7 %    BASOPHILS 0 0 - 1 %    IMMATURE GRANULOCYTES 0 0 - 0.5 %    ABS. NEUTROPHILS 5.9 1.8 - 8.0 K/UL    ABS. LYMPHOCYTES 2.3 0.8 - 3.5 K/UL    ABS. MONOCYTES 0.7 0.0 - 1.0 K/UL    ABS. EOSINOPHILS 0.1 0.0 - 0.4 K/UL    ABS. BASOPHILS 0.0 0.0 - 0.1 K/UL    ABS. IMM. GRANS. 0.0 0.00 - 0.04 K/UL    DF AUTOMATED     METABOLIC PANEL, COMPREHENSIVE    Collection Time: 10/29/21  3:50 PM   Result Value Ref Range    Sodium 136 136 - 145 mmol/L    Potassium 3.8 3.5 - 5.1 mmol/L    Chloride 106 97 - 108 mmol/L    CO2 23 21 - 32 mmol/L    Anion gap 7 5 - 15 mmol/L    Glucose 107 (H) 65 - 100 mg/dL    BUN 10 6 - 20 mg/dL    Creatinine 0.62 0.55 - 1.02 mg/dL    BUN/Creatinine ratio 16 12 - 20      GFR est AA >60 >60 ml/min/1.73m2    GFR est non-AA >60 >60 ml/min/1.73m2    Calcium 9.1 8.5 - 10.1 mg/dL    Bilirubin, total 0.2 0.2 - 1.0 mg/dL    AST (SGOT) 13 (L) 15 - 37 U/L    ALT (SGPT) 18 12 - 78 U/L    Alk.  phosphatase 60 45 - 117 U/L    Protein, total 7.4 6.4 - 8.2 g/dL    Albumin 3.3 (L) 3.5 - 5.0 g/dL    Globulin 4.1 (H) 2.0 - 4.0 g/dL    A-G Ratio 0.8 (L) 1.1 - 2.2     TYPE & SCREEN    Collection Time: 10/29/21  3:50 PM   Result Value Ref Range    Crossmatch Expiration 11/01/2021,2359     ABO/Rh(D) A Positive     Antibody screen Negative    BETA HCG, QT    Collection Time: 10/29/21  3:50 PM   Result Value Ref Range    Beta HCG, QT 58,327.8 (H) 0 - 6 mIU/mL   URINALYSIS W/ REFLEX CULTURE    Collection Time: 10/29/21  4:30 PM    Specimen: Urine   Result Value Ref Range    Color Yellow/Straw      Appearance Clear Clear      Specific gravity 1.010 1.003 - 1.030      pH (UA) 7.0 5.0 - 8.0      Protein Negative Negative mg/dL    Glucose Negative Negative mg/dL    Ketone Negative Negative mg/dL    Bilirubin Negative Negative      Blood Small (A) Negative      Urobilinogen 0.1 0.1 - 1.0 EU/dL    Nitrites Negative Negative      Leukocyte Esterase Negative Negative      UA:UC IF INDICATED Urine Culture Ordered (A) Culture not indicated by UA result      WBC 0-5 0 - 4 /hpf    RBC 0-5 0 - 5 /hpf    Bacteria Negative Negative /hpf    Mucus Trace /lpf       Radiologic Studies -   [unfilled]  CT Results  (Last 48 hours)    None        CXR Results  (Last 48 hours)    None          Medical Decision Making and ED Course   - I am the first and primary provider for this patient AND AM THE PRIMARY PROVIDER OF RECORD. - I reviewed the vital signs, available nursing notes, past medical history, past surgical history, family history and social history. - Initial assessment performed. The patients presenting problems have been discussed, and the staff are in agreement with the care plan formulated and outlined with them. I have encouraged them to ask questions as they arise throughout their visit. Vital Signs-Reviewed the patient's vital signs. Patient Vitals for the past 12 hrs:   Temp Pulse Resp BP SpO2   10/29/21 1806 98 °F (36.7 °C) 77 12 122/77 100 %   10/29/21 1518 98.5 °F (36.9 °C) 85 18 131/87 98 %         Records Reviewed: Nursing Notes    Provider Notes (Medical Decision Making):   Patient presenting with vaginal bleeding. She disclosed to triage that she has some cramping but when I asked her she said she had some feeling of that she is in a pass gas and when she did her symptoms has resolved. Her main issue is evaluating the started earlier today. Small amount of bright red blood without any pain or any additional symptoms. CBC was unremarkable. Blood group is A+, chemistry within normal, urinalysis showed small blood but otherwise unremarkable. Transvaginal ultrasound pelvic ultrasound showed a single live intrauterine gestation at 11 weeks and 1 day without evidence of hemorrhage. Cervical os was closed.   Thus, discussed with the patient her finding and that the possibility of miscarriage cannot be ruled out and that she should continue pelvic rest till she sees her OB/GYN for reevaluation. And she verbalized understanding. Stressed on importance of coming back if she has any worsening of her symptoms or any other concerning symptoms. Disposition     Disposition: Condition stable  DC- Adult Discharges: All of the diagnostic tests were reviewed and questions answered. Diagnosis, care plan and treatment options were discussed. The patient understands the instructions and will follow up as directed. The patients results have been reviewed with them. They have been counseled regarding their diagnosis. The patient verbally convey understanding and agreement of the signs, symptoms, diagnosis, treatment and prognosis and additionally agrees to follow up as recommended with their PCP in 24 - 48 hours. They also agree with the care-plan and convey that all of their questions have been answered. I have also put together some discharge instructions for them that include: 1) educational information regarding their diagnosis, 2) how to care for their diagnosis at home, as well a 3) list of reasons why they would want to return to the ED prior to their follow-up appointment, should their condition change. Discharged      DISCHARGE PLAN:  1. Current Discharge Medication List      CONTINUE these medications which have NOT CHANGED    Details   !! terconazole (TERAZOL 7) 0.4 % vaginal cream Insert 1 Applicator into vagina nightly. Qty: 45 g, Refills: 0    Associated Diagnoses: Vaginitis and vulvovaginitis      acetaminophen (TYLENOL) 325 mg tablet Take 2 Tablets by mouth every six (6) hours as needed for Pain. Qty: 20 Tablet, Refills: 0      aspirin delayed-release 81 mg tablet Take  by mouth daily. calcium-cholecalciferol, d3, (CALCIUM 600 + D) 600-125 mg-unit tab Take  by mouth.       ferrous sulfate 325 mg (65 mg iron) tablet Take  by mouth Daily (before breakfast). fluconazole (DIFLUCAN) 150 mg tablet Take 150 mg by mouth daily. FDA advises cautious prescribing of oral fluconazole in pregnancy. !! ibuprofen (MOTRIN) 600 mg tablet Take  by mouth every six (6) hours as needed for Pain. !! ibuprofen (MOTRIN) 800 mg tablet Take  by mouth. hydroxyprogesterone, PF, (Pat, PF,) 275 mg/1.1 mL atIn subcutaneous injection 275 mg by SubCUTAneous route every seven (7) days. metoclopramide HCl (REGLAN) 10 mg tablet Take 10 mg by mouth Before breakfast, lunch, dinner and at bedtime. metroNIDAZOLE (FLAGYL) 500 mg tablet Take  by mouth three (3) times daily. oseltamivir (TAMIFLU) 75 mg capsule Take  by mouth. oxyCODONE-acetaminophen (PERCOCET) 5-325 mg per tablet Take  by mouth every four (4) hours as needed for Pain.      prenatal vitamin (Prenatal) 28 mg iron- 800 mcg tab tablet Take 1 Tab by mouth daily. norgestimate-ethinyl estradioL (Sprintec, 28,) 0.25-35 mg-mcg tab Take 1 Tab by mouth daily. !! terconazole (TERAZOL 7) 0.4 % vaginal cream Insert 1 Applicator into vagina nightly. !! - Potential duplicate medications found. Please discuss with provider. 2.   Follow-up Information     Follow up With Specialties Details Why Contact Info    Abimael Burt MD Obstetrics & Gynecology Schedule an appointment as soon as possible for a visit  OB/GYN, for ER follow up [address] [place] [address]  [phone]      [facility] EMERGENCY DEPT Emergency Medicine Go to  As needed, If symptoms worsen [facility] [place] [address]  [phone]        3. Return to ED if worse   4. Discharge Medication List as of 10/29/2021  6:09 PM          Diagnosis     Clinical Impression:   1. Vaginal bleeding    2. Threatened miscarriage        Attestations:     Heidi Golden MD    Please note that this dictation was completed with Plat[employer], the computer voice recognition software. Quite often unanticipated grammatical, syntax, homophones, and other interpretive errors are inadvertently transcribed by the computer software. Please disregard these errors. Please excuse any errors that have escaped final proofreading. Thank you.

## 2021-10-29 NOTE — ED TRIAGE NOTES
GCS 15 pt stated that she is 11 weeks pregnant; stated that this am she started to have what felt like gas bubbles/cramping and then started spotting

## 2021-10-29 NOTE — DISCHARGE INSTRUCTIONS
Thank you! Thank you for allowing me to care for you in the emergency department. I sincerely hope that you are satisfied with your visit today. It is my goal to provide you with excellent care. Below you will find a list of your labs and imaging from your visit today. Should you have any questions regarding these results please do not hesitate to call the emergency department. Labs -     Recent Results (from the past 12 hour(s))   CBC WITH AUTOMATED DIFF    Collection Time: 10/29/21  3:50 PM   Result Value Ref Range    WBC 9.0 3.6 - 11.0 K/uL    RBC 4.25 3.80 - 5.20 M/uL    HGB 11.6 11.5 - 16.0 g/dL    HCT 35.2 35.0 - 47.0 %    MCV 82.8 80.0 - 99.0 FL    MCH 27.3 26.0 - 34.0 PG    MCHC 33.0 30.0 - 36.5 g/dL    RDW 14.5 11.5 - 14.5 %    PLATELET 803 090 - 382 K/uL    MPV 10.9 8.9 - 12.9 FL    NRBC 0.0 0.0  WBC    ABSOLUTE NRBC 0.00 0.00 - 0.01 K/uL    NEUTROPHILS 67 32 - 75 %    LYMPHOCYTES 25 12 - 49 %    MONOCYTES 7 5 - 13 %    EOSINOPHILS 1 0 - 7 %    BASOPHILS 0 0 - 1 %    IMMATURE GRANULOCYTES 0 0 - 0.5 %    ABS. NEUTROPHILS 5.9 1.8 - 8.0 K/UL    ABS. LYMPHOCYTES 2.3 0.8 - 3.5 K/UL    ABS. MONOCYTES 0.7 0.0 - 1.0 K/UL    ABS. EOSINOPHILS 0.1 0.0 - 0.4 K/UL    ABS. BASOPHILS 0.0 0.0 - 0.1 K/UL    ABS. IMM. GRANS. 0.0 0.00 - 0.04 K/UL    DF AUTOMATED     METABOLIC PANEL, COMPREHENSIVE    Collection Time: 10/29/21  3:50 PM   Result Value Ref Range    Sodium 136 136 - 145 mmol/L    Potassium 3.8 3.5 - 5.1 mmol/L    Chloride 106 97 - 108 mmol/L    CO2 23 21 - 32 mmol/L    Anion gap 7 5 - 15 mmol/L    Glucose 107 (H) 65 - 100 mg/dL    BUN 10 6 - 20 mg/dL    Creatinine 0.62 0.55 - 1.02 mg/dL    BUN/Creatinine ratio 16 12 - 20      GFR est AA >60 >60 ml/min/1.73m2    GFR est non-AA >60 >60 ml/min/1.73m2    Calcium 9.1 8.5 - 10.1 mg/dL    Bilirubin, total 0.2 0.2 - 1.0 mg/dL    AST (SGOT) 13 (L) 15 - 37 U/L    ALT (SGPT) 18 12 - 78 U/L    Alk.  phosphatase 60 45 - 117 U/L    Protein, total 7.4 6.4 - 8.2 g/dL    Albumin 3.3 (L) 3.5 - 5.0 g/dL    Globulin 4.1 (H) 2.0 - 4.0 g/dL    A-G Ratio 0.8 (L) 1.1 - 2.2     TYPE & SCREEN    Collection Time: 10/29/21  3:50 PM   Result Value Ref Range    Crossmatch Expiration 11/01/2021,2359     ABO/Rh(D) A Positive     Antibody screen Negative    URINALYSIS W/ REFLEX CULTURE    Collection Time: 10/29/21  4:30 PM    Specimen: Urine   Result Value Ref Range    Color Yellow/Straw      Appearance Clear Clear      Specific gravity 1.010 1.003 - 1.030      pH (UA) 7.0 5.0 - 8.0      Protein Negative Negative mg/dL    Glucose Negative Negative mg/dL    Ketone Negative Negative mg/dL    Bilirubin Negative Negative      Blood Small (A) Negative      Urobilinogen 0.1 0.1 - 1.0 EU/dL    Nitrites Negative Negative      Leukocyte Esterase Negative Negative      UA:UC IF INDICATED Urine Culture Ordered (A) Culture not indicated by UA result      WBC 0-5 0 - 4 /hpf    RBC 0-5 0 - 5 /hpf    Bacteria Negative Negative /hpf    Mucus Trace /lpf       Radiologic Studies -   US PREG UTS LTD   Final Result   1. Single live intrauterine gestation 11 weeks 1 day, estimated date delivery   5/19/2022. This is concordant with LMP dating of 11 weeks 5 days (LMP provided   8/8/2021). This is concordant with prior ultrasound dating. 2. No evidence of implantation hemorrhage. CT Results  (Last 48 hours)      None          CXR Results  (Last 48 hours)      None               If you feel that you have not received excellent quality care or timely care, please ask to speak to the nurse manager. Please choose us in the future for your continued health care needs. ------------------------------------------------------------------------------------------------------------  The exam and treatment you received in the Emergency Department were for an urgent problem and are not intended as complete care.  It is important that you follow-up with a doctor, nurse practitioner, or physician assistant to:  (1) confirm your diagnosis,  (2) re-evaluation of changes in your illness and treatment, and  (3) for ongoing care. If your symptoms become worse or you do not improve as expected and you are unable to reach your usual health care provider, you should return to the Emergency Department. We are available 24 hours a day. Please take your discharge instructions with you when you go to your follow-up appointment. If you have any problem arranging a follow-up appointment, contact the Emergency Department immediately. If a prescription has been provided, please have it filled as soon as possible to prevent a delay in treatment. Read the entire medication instruction sheet provided to you by the pharmacy. If you have any questions or reservations about taking the medication due to side effects or interactions with other medications, please call your primary care physician or contact the ER to speak with the charge nurse. Make an appointment with your family doctor or the physician you were referred to for follow-up of this visit as instructed on your discharge paperwork, as this is a mandatory follow-up. Return to the ER if you are unable to be seen or if you are unable to be seen in a timely manner. If you have any problem arranging the follow-up visit, contact the Emergency Department immediately.

## 2021-10-31 LAB
BACTERIA SPEC CULT: NORMAL
SPECIAL REQUESTS,SREQ: NORMAL

## 2021-11-16 ENCOUNTER — ROUTINE PRENATAL (OUTPATIENT)
Dept: OBGYN CLINIC | Age: 31
End: 2021-11-16
Payer: COMMERCIAL

## 2021-11-16 VITALS
HEIGHT: 66 IN | WEIGHT: 245 LBS | RESPIRATION RATE: 16 BRPM | SYSTOLIC BLOOD PRESSURE: 124 MMHG | BODY MASS INDEX: 39.37 KG/M2 | DIASTOLIC BLOOD PRESSURE: 76 MMHG | HEART RATE: 91 BPM | OXYGEN SATURATION: 97 %

## 2021-11-16 DIAGNOSIS — Z36.9 UNSPECIFIED ANTENATAL SCREENING: ICD-10-CM

## 2021-11-16 DIAGNOSIS — O09.90 HIGH RISK PREGNANCY, ANTEPARTUM: Primary | ICD-10-CM

## 2021-11-16 DIAGNOSIS — N88.3 INCOMPETENT CERVIX: ICD-10-CM

## 2021-11-16 DIAGNOSIS — Z3A.13 13 WEEKS GESTATION OF PREGNANCY: ICD-10-CM

## 2021-11-16 PROCEDURE — 0502F SUBSEQUENT PRENATAL CARE: CPT | Performed by: OBSTETRICS & GYNECOLOGY

## 2021-11-16 NOTE — LETTER
NOTIFICATION RETURN TO WORK / SCHOOL    11/16/2021     Ms. Lazo  [address] [address]      To Whom It May Concern:    Violet is currently under the care of [address] [facility] [place]. Patient is to be on light duty with no lifting of any kind. She will return to work/school on: 11/17/2021    If there are questions or concerns please have the patient contact our office.         Sincerely,      Pacheco Coley MD

## 2021-11-21 LAB
ABOUT THE TEST: NORMAL
FET TS 13 RISK PLAS.CFDNA QL: NEGATIVE
FETAL FRACTION: NORMAL
FETAL SEX: NORMAL
GA EST FROM CONCEPTION DATE: NORMAL D
GESTATIONAL AGE >=9W: YES
LAB DIRECTOR NAME PROVIDER: NORMAL
LAB DIRECTOR NAME PROVIDER: NORMAL
LIMITATIONS OF THE TEST: NORMAL
NEGATIVE PREDICTIVE VALUE: NORMAL
NOTE: NORMAL
PERFORMANCE CHARACTERISTICS: NORMAL
POSITIVE PREDICTIVE VALUE: NORMAL
REF LAB TEST METHOD: NORMAL
REFERENCES: NORMAL
RESULT, 451942: NEGATIVE
TEST PERFORMANCE INFO SPEC: NORMAL
TRISOMY 18 (EDWARDS SYNDROME): NEGATIVE
TRISOMY 21 (DOWN SYNDROME): NEGATIVE

## 2021-11-23 NOTE — PROGRESS NOTES
JENNIFER visit  Doing well   No pain no bleeding   Cerclage per MFM  Viable iup on ultrasound  precautions

## 2021-12-07 ENCOUNTER — TELEPHONE (OUTPATIENT)
Dept: OBGYN CLINIC | Age: 31
End: 2021-12-07

## 2021-12-07 NOTE — TELEPHONE ENCOUNTER
Patient called stating she needs the work note from 11/16/21 faxed to her employer stating she has a lift restriction. Patient will fax the work note to the patient's employer once she provides a fax number.

## 2021-12-08 ENCOUNTER — TELEPHONE (OUTPATIENT)
Dept: OBGYN CLINIC | Age: 31
End: 2021-12-08

## 2021-12-08 NOTE — TELEPHONE ENCOUNTER
Patient called stating her job is giving her a hard time with regards to the note stating she is not to lift anything. Note was refaxed to them on yesterday. Spoke with Ferdinand Sandhu in the [employer] at [facility] and verified the patient is not to lift anything. She states a determination will have to be made as to whether they can accommodate the patient.

## 2021-12-13 ENCOUNTER — ROUTINE PRENATAL (OUTPATIENT)
Dept: OBGYN CLINIC | Age: 31
End: 2021-12-13
Payer: MEDICAID

## 2021-12-13 VITALS
BODY MASS INDEX: 40.34 KG/M2 | HEIGHT: 66 IN | DIASTOLIC BLOOD PRESSURE: 77 MMHG | OXYGEN SATURATION: 98 % | RESPIRATION RATE: 16 BRPM | SYSTOLIC BLOOD PRESSURE: 120 MMHG | HEART RATE: 96 BPM | WEIGHT: 251 LBS

## 2021-12-13 DIAGNOSIS — N88.3 INCOMPETENT CERVIX: ICD-10-CM

## 2021-12-13 DIAGNOSIS — Z3A.17 17 WEEKS GESTATION OF PREGNANCY: ICD-10-CM

## 2021-12-13 DIAGNOSIS — O09.90 HIGH RISK PREGNANCY, ANTEPARTUM: Primary | ICD-10-CM

## 2021-12-13 DIAGNOSIS — Z36.9 UNSPECIFIED ANTENATAL SCREENING: ICD-10-CM

## 2021-12-13 PROCEDURE — 0502F SUBSEQUENT PRENATAL CARE: CPT | Performed by: OBSTETRICS & GYNECOLOGY

## 2021-12-13 NOTE — PATIENT INSTRUCTIONS
Counting Your Baby's Kicks: Care Instructions  Overview     Counting your baby's kicks is one way your doctor can tell that your baby is healthy. Most women--especially in a first pregnancy--feel their baby move for the first time between 16 and 22 weeks. The movement may feel like flutters rather than kicks. Your baby may move more at certain times of the day. When you are active, you may notice less kicking than when you are resting. At your prenatal visits, your doctor will ask whether the baby is active. In your last trimester, your doctor may ask you to count the number of times you feel your baby move. Follow-up care is a key part of your treatment and safety. Be sure to make and go to all appointments, and call your doctor if you are having problems. It's also a good idea to know your test results and keep a list of the medicines you take. How do you count fetal kicks? · A common method of checking your baby's movement is to note the length of time it takes to count ten movements (such as kicks, flutters, or rolls). · Pick your baby's most active time of day to count. This may be any time from morning to evening. · If you don't feel 10 movements in an hour, have something to eat or drink and count for another hour. If you don't feel at least 10 movements in the 2-hour period, call your doctor. When should you call for help? Call your doctor now or seek immediate medical care if:    · You noticed that your baby has stopped moving or is moving much less than normal.   Watch closely for changes in your health, and be sure to contact your doctor if you have any problems. Where can you learn more? Go to http://www.gray.com/  Enter [id number] in the search box to learn more about \"Counting Your Baby's Kicks: Care Instructions. \"  Current as of: June 16, 2021               Content Version: 13.0  © 7131-2217 Healthwise, Incorporated.    Care instructions adapted under license by [employer] (which disclaims liability or warranty for this information). If you have questions about a medical condition or this instruction, always ask your healthcare professional. [address] any warranty or liability for your use of this information.

## 2021-12-13 NOTE — PROGRESS NOTES
SUBJECTIVE:  Krystin Mora presents today for return prenatal visit. She complains of: no unusual complaints. Patient's medical, obstetrical, social, and family histories; medications; and lab results have been reviewed. REVIEW OF SYSTEMS: A comprehensive review of systems was negative except for that written in the HPI.    17w3d    OBJECTIVE:   Visit Vitals  /77   Pulse 96   Resp 16   Ht 5' 6\" (1.676 m)   Wt 251 lb (113.9 kg)   LMP 08/08/2021   SpO2 98%   BMI 40.51 kg/m²      Refer to Prenatal Physical for exam findings    ASSESSMENT/PLAN:  Prenatal Education: 2nd trimester topics:  pregnancy danger signs. AFP today,  [facility] appt 12/15  Patient/guardian understands and agrees with the plan of care.

## 2021-12-21 ENCOUNTER — HOSPITAL ENCOUNTER (INPATIENT)
Age: 31
LOS: 1 days | Discharge: HOME | End: 2021-12-23
Attending: EMERGENCY MEDICINE | Admitting: OBSTETRICS & GYNECOLOGY
Payer: COMMERCIAL

## 2021-12-21 ENCOUNTER — APPOINTMENT (OUTPATIENT)
Dept: ULTRASOUND IMAGING | Age: 31
End: 2021-12-21
Attending: EMERGENCY MEDICINE
Payer: COMMERCIAL

## 2021-12-21 DIAGNOSIS — O03.9 INEVITABLE COMPLETE MISCARRIAGE WITHOUT COMPLICATION: Primary | ICD-10-CM

## 2021-12-21 DIAGNOSIS — Z3A.18 18 WEEKS GESTATION OF PREGNANCY: ICD-10-CM

## 2021-12-21 PROBLEM — Z34.90 PREGNANT: Status: ACTIVE | Noted: 2021-12-21

## 2021-12-21 LAB
ABO + RH BLD: NORMAL
ALBUMIN SERPL-MCNC: 2.9 G/DL (ref 3.5–5)
ALBUMIN/GLOB SERPL: 0.7 {RATIO} (ref 1.1–2.2)
ALP SERPL-CCNC: 81 U/L (ref 45–117)
ALT SERPL-CCNC: 19 U/L (ref 12–78)
ANION GAP SERPL CALC-SCNC: 8 MMOL/L (ref 5–15)
AST SERPL W P-5'-P-CCNC: 12 U/L (ref 15–37)
BASOPHILS # BLD: 0 K/UL (ref 0–0.1)
BASOPHILS NFR BLD: 0 % (ref 0–1)
BILIRUB SERPL-MCNC: 0.2 MG/DL (ref 0.2–1)
BLOOD GROUP ANTIBODIES SERPL: NEGATIVE
BUN SERPL-MCNC: 6 MG/DL (ref 6–20)
BUN/CREAT SERPL: 9 (ref 12–20)
CA-I BLD-MCNC: 9.2 MG/DL (ref 8.5–10.1)
CHLORIDE SERPL-SCNC: 105 MMOL/L (ref 97–108)
CO2 SERPL-SCNC: 25 MMOL/L (ref 21–32)
CREAT SERPL-MCNC: 0.69 MG/DL (ref 0.55–1.02)
DIFFERENTIAL METHOD BLD: ABNORMAL
EOSINOPHIL # BLD: 0.2 K/UL (ref 0–0.4)
EOSINOPHIL NFR BLD: 2 % (ref 0–7)
ERYTHROCYTE [DISTWIDTH] IN BLOOD BY AUTOMATED COUNT: 14.1 % (ref 11.5–14.5)
GLOBULIN SER CALC-MCNC: 4.4 G/DL (ref 2–4)
GLUCOSE SERPL-MCNC: 90 MG/DL (ref 65–100)
HCT VFR BLD AUTO: 34.6 % (ref 35–47)
HGB BLD-MCNC: 11.4 G/DL (ref 11.5–16)
IMM GRANULOCYTES # BLD AUTO: 0.1 K/UL (ref 0–0.04)
IMM GRANULOCYTES NFR BLD AUTO: 1 % (ref 0–0.5)
LYMPHOCYTES # BLD: 2.8 K/UL (ref 0.8–3.5)
LYMPHOCYTES NFR BLD: 22 % (ref 12–49)
MCH RBC QN AUTO: 27.9 PG (ref 26–34)
MCHC RBC AUTO-ENTMCNC: 32.9 G/DL (ref 30–36.5)
MCV RBC AUTO: 84.6 FL (ref 80–99)
MONOCYTES # BLD: 0.9 K/UL (ref 0–1)
MONOCYTES NFR BLD: 7 % (ref 5–13)
NEUTS SEG # BLD: 8.9 K/UL (ref 1.8–8)
NEUTS SEG NFR BLD: 68 % (ref 32–75)
NRBC # BLD: 0 K/UL (ref 0–0.01)
NRBC BLD-RTO: 0 PER 100 WBC
PLATELET # BLD AUTO: 241 K/UL (ref 150–400)
PMV BLD AUTO: 10.8 FL (ref 8.9–12.9)
POTASSIUM SERPL-SCNC: 3.3 MMOL/L (ref 3.5–5.1)
PROT SERPL-MCNC: 7.3 G/DL (ref 6.4–8.2)
RBC # BLD AUTO: 4.09 M/UL (ref 3.8–5.2)
SODIUM SERPL-SCNC: 138 MMOL/L (ref 136–145)
SPECIMEN EXP DATE BLD: NORMAL
WBC # BLD AUTO: 13 K/UL (ref 3.6–11)

## 2021-12-21 PROCEDURE — 74011250636 HC RX REV CODE- 250/636

## 2021-12-21 PROCEDURE — 76815 OB US LIMITED FETUS(S): CPT

## 2021-12-21 PROCEDURE — 0UCC7ZZ EXTIRPATION OF MATTER FROM CERVIX, VIA NATURAL OR ARTIFICIAL OPENING: ICD-10-PCS | Performed by: OBSTETRICS & GYNECOLOGY

## 2021-12-21 PROCEDURE — 86901 BLOOD TYPING SEROLOGIC RH(D): CPT

## 2021-12-21 PROCEDURE — 3E033VJ INTRODUCTION OF OTHER HORMONE INTO PERIPHERAL VEIN, PERCUTANEOUS APPROACH: ICD-10-PCS | Performed by: OBSTETRICS & GYNECOLOGY

## 2021-12-21 PROCEDURE — 74011250636 HC RX REV CODE- 250/636: Performed by: OBSTETRICS & GYNECOLOGY

## 2021-12-21 PROCEDURE — 36415 COLL VENOUS BLD VENIPUNCTURE: CPT

## 2021-12-21 PROCEDURE — 74011000258 HC RX REV CODE- 258: Performed by: OBSTETRICS & GYNECOLOGY

## 2021-12-21 PROCEDURE — 85025 COMPLETE CBC W/AUTO DIFF WBC: CPT

## 2021-12-21 PROCEDURE — 80053 COMPREHEN METABOLIC PANEL: CPT

## 2021-12-21 PROCEDURE — 99283 EMERGENCY DEPT VISIT LOW MDM: CPT

## 2021-12-21 RX ORDER — BUTORPHANOL TARTRATE 1 MG/ML
1 INJECTION INTRAMUSCULAR; INTRAVENOUS
Status: DISCONTINUED | OUTPATIENT
Start: 2021-12-21 | End: 2021-12-23

## 2021-12-21 RX ORDER — PENICILLIN G 3000000 [IU]/50ML
3 INJECTION, SOLUTION INTRAVENOUS EVERY 4 HOURS
Status: DISCONTINUED | OUTPATIENT
Start: 2021-12-22 | End: 2021-12-23

## 2021-12-21 RX ORDER — BUTORPHANOL TARTRATE 1 MG/ML
INJECTION INTRAMUSCULAR; INTRAVENOUS
Status: COMPLETED
Start: 2021-12-21 | End: 2021-12-21

## 2021-12-21 RX ORDER — SODIUM CHLORIDE, SODIUM LACTATE, POTASSIUM CHLORIDE, CALCIUM CHLORIDE 600; 310; 30; 20 MG/100ML; MG/100ML; MG/100ML; MG/100ML
125 INJECTION, SOLUTION INTRAVENOUS CONTINUOUS
Status: DISCONTINUED | OUTPATIENT
Start: 2021-12-21 | End: 2021-12-23

## 2021-12-21 RX ADMIN — SODIUM CHLORIDE, POTASSIUM CHLORIDE, SODIUM LACTATE AND CALCIUM CHLORIDE 125 ML/HR: 600; 310; 30; 20 INJECTION, SOLUTION INTRAVENOUS at 22:25

## 2021-12-21 RX ADMIN — BUTORPHANOL TARTRATE 1 MG: 1 INJECTION, SOLUTION INTRAMUSCULAR; INTRAVENOUS at 22:36

## 2021-12-21 RX ADMIN — SODIUM CHLORIDE 5 MILLION UNITS: 900 INJECTION INTRAVENOUS at 22:36

## 2021-12-22 ENCOUNTER — APPOINTMENT (OUTPATIENT)
Dept: ULTRASOUND IMAGING | Age: 31
End: 2021-12-22
Attending: OBSTETRICS & GYNECOLOGY
Payer: COMMERCIAL

## 2021-12-22 PROBLEM — N88.3 CERVICAL INCOMPETENCE: Status: ACTIVE | Noted: 2021-12-22

## 2021-12-22 PROBLEM — O42.111 PRETERM PREMATURE RUPTURE OF MEMBRANES (PPROM) WITH ONSET OF LABOR AFTER 24 HOURS OF RUPTURE IN FIRST TRIMESTER, ANTEPARTUM: Status: ACTIVE | Noted: 2021-12-22

## 2021-12-22 PROBLEM — Z34.90 PREGNANT: Status: RESOLVED | Noted: 2021-12-21 | Resolved: 2021-12-22

## 2021-12-22 PROBLEM — O02.1 FETAL DEMISE BEFORE 20 WEEKS WITH RETENTION OF DEAD FETUS: Status: ACTIVE | Noted: 2021-12-22

## 2021-12-22 PROBLEM — Z3A.18 18 WEEKS GESTATION OF PREGNANCY: Status: ACTIVE | Noted: 2021-12-22

## 2021-12-22 LAB
ALBUMIN SERPL-MCNC: 2.4 G/DL (ref 3.5–5)
ALBUMIN/GLOB SERPL: 0.7 {RATIO} (ref 1.1–2.2)
ALP SERPL-CCNC: 65 U/L (ref 45–117)
ALT SERPL-CCNC: 15 U/L (ref 12–78)
ANION GAP SERPL CALC-SCNC: 8 MMOL/L (ref 5–15)
AST SERPL W P-5'-P-CCNC: 10 U/L (ref 15–37)
BASOPHILS # BLD: 0.1 K/UL (ref 0–0.1)
BASOPHILS NFR BLD: 0 % (ref 0–1)
BILIRUB SERPL-MCNC: 0.2 MG/DL (ref 0.2–1)
BUN SERPL-MCNC: 5 MG/DL (ref 6–20)
BUN/CREAT SERPL: 10 (ref 12–20)
CA-I BLD-MCNC: 8.7 MG/DL (ref 8.5–10.1)
CHLORIDE SERPL-SCNC: 108 MMOL/L (ref 97–108)
CO2 SERPL-SCNC: 23 MMOL/L (ref 21–32)
COVID-19 RAPID TEST, COVR: NOT DETECTED
CREAT SERPL-MCNC: 0.5 MG/DL (ref 0.55–1.02)
DIFFERENTIAL METHOD BLD: ABNORMAL
EOSINOPHIL # BLD: 0.2 K/UL (ref 0–0.4)
EOSINOPHIL NFR BLD: 2 % (ref 0–7)
ERYTHROCYTE [DISTWIDTH] IN BLOOD BY AUTOMATED COUNT: 14.2 % (ref 11.5–14.5)
GLOBULIN SER CALC-MCNC: 3.6 G/DL (ref 2–4)
GLUCOSE SERPL-MCNC: 99 MG/DL (ref 65–100)
HCT VFR BLD AUTO: 32.5 % (ref 35–47)
HGB BLD-MCNC: 10.6 G/DL (ref 11.5–16)
IMM GRANULOCYTES # BLD AUTO: 0.1 K/UL (ref 0–0.04)
IMM GRANULOCYTES NFR BLD AUTO: 1 % (ref 0–0.5)
LACTATE SERPL-SCNC: 1.4 MMOL/L (ref 0.4–2)
LYMPHOCYTES # BLD: 3.3 K/UL (ref 0.8–3.5)
LYMPHOCYTES NFR BLD: 27 % (ref 12–49)
MCH RBC QN AUTO: 27.7 PG (ref 26–34)
MCHC RBC AUTO-ENTMCNC: 32.6 G/DL (ref 30–36.5)
MCV RBC AUTO: 84.9 FL (ref 80–99)
MONOCYTES # BLD: 0.7 K/UL (ref 0–1)
MONOCYTES NFR BLD: 5 % (ref 5–13)
NEUTS SEG # BLD: 8.1 K/UL (ref 1.8–8)
NEUTS SEG NFR BLD: 65 % (ref 32–75)
NRBC # BLD: 0 K/UL (ref 0–0.01)
NRBC BLD-RTO: 0 PER 100 WBC
PLATELET # BLD AUTO: 218 K/UL (ref 150–400)
PMV BLD AUTO: 11.1 FL (ref 8.9–12.9)
POTASSIUM SERPL-SCNC: 3.4 MMOL/L (ref 3.5–5.1)
PROT SERPL-MCNC: 6 G/DL (ref 6.4–8.2)
RBC # BLD AUTO: 3.83 M/UL (ref 3.8–5.2)
SODIUM SERPL-SCNC: 139 MMOL/L (ref 136–145)
SPECIMEN SOURCE: NORMAL
WBC # BLD AUTO: 12.4 K/UL (ref 3.6–11)

## 2021-12-22 PROCEDURE — 76815 OB US LIMITED FETUS(S): CPT

## 2021-12-22 PROCEDURE — 65410000002 HC RM PRIVATE OB

## 2021-12-22 PROCEDURE — 99232 SBSQ HOSP IP/OBS MODERATE 35: CPT | Performed by: OBSTETRICS & GYNECOLOGY

## 2021-12-22 PROCEDURE — 74011250636 HC RX REV CODE- 250/636: Performed by: OBSTETRICS & GYNECOLOGY

## 2021-12-22 PROCEDURE — 36415 COLL VENOUS BLD VENIPUNCTURE: CPT

## 2021-12-22 PROCEDURE — 85025 COMPLETE CBC W/AUTO DIFF WBC: CPT

## 2021-12-22 PROCEDURE — 83605 ASSAY OF LACTIC ACID: CPT

## 2021-12-22 PROCEDURE — 80053 COMPREHEN METABOLIC PANEL: CPT

## 2021-12-22 PROCEDURE — 87635 SARS-COV-2 COVID-19 AMP PRB: CPT

## 2021-12-22 RX ORDER — OXYTOCIN/RINGER'S LACTATE 30/500 ML
1-25 PLASTIC BAG, INJECTION (ML) INTRAVENOUS
Status: DISCONTINUED | OUTPATIENT
Start: 2021-12-22 | End: 2021-12-23

## 2021-12-22 RX ORDER — ZOLPIDEM TARTRATE 5 MG/1
5 TABLET ORAL
Status: DISCONTINUED | OUTPATIENT
Start: 2021-12-22 | End: 2021-12-23

## 2021-12-22 RX ADMIN — PENICILLIN G 3 MILLION UNITS: 3000000 INJECTION, SOLUTION INTRAVENOUS at 20:20

## 2021-12-22 RX ADMIN — PENICILLIN G 3 MILLION UNITS: 3000000 INJECTION, SOLUTION INTRAVENOUS at 15:23

## 2021-12-22 RX ADMIN — PENICILLIN G 3 MILLION UNITS: 3000000 INJECTION, SOLUTION INTRAVENOUS at 11:53

## 2021-12-22 RX ADMIN — PENICILLIN G 3 MILLION UNITS: 3000000 INJECTION, SOLUTION INTRAVENOUS at 07:16

## 2021-12-22 RX ADMIN — SODIUM CHLORIDE, POTASSIUM CHLORIDE, SODIUM LACTATE AND CALCIUM CHLORIDE 125 ML/HR: 600; 310; 30; 20 INJECTION, SOLUTION INTRAVENOUS at 19:15

## 2021-12-22 RX ADMIN — SODIUM CHLORIDE, POTASSIUM CHLORIDE, SODIUM LACTATE AND CALCIUM CHLORIDE 125 ML/HR: 600; 310; 30; 20 INJECTION, SOLUTION INTRAVENOUS at 03:09

## 2021-12-22 RX ADMIN — SODIUM CHLORIDE, POTASSIUM CHLORIDE, SODIUM LACTATE AND CALCIUM CHLORIDE 125 ML/HR: 600; 310; 30; 20 INJECTION, SOLUTION INTRAVENOUS at 10:12

## 2021-12-22 RX ADMIN — Medication 2 MILLI-UNITS/MIN: at 09:24

## 2021-12-22 RX ADMIN — PENICILLIN G 3 MILLION UNITS: 3000000 INJECTION, SOLUTION INTRAVENOUS at 03:09

## 2021-12-22 RX ADMIN — Medication 16 MILLI-UNITS/MIN: at 15:24

## 2021-12-22 NOTE — ETHICS
Clinical Ethics Consultation Note    Ethics was consulted regarding patient situation by labor and delivery. Patient has ruptured membrane which has created significant risk of infection; induction is permissible.

## 2021-12-22 NOTE — PROGRESS NOTES
0700 Received report on pt. Assumed care of the pt. No co pain, no ucs palpated. Or traced. Pt. Given ice chips. POC reviewed with pt. Pads changed, bloody amniotic fluid noted. Antibiotic dose given. 0800 Dr. Martha Zaragoza in to see pt. VE done, 2 cms. Notes bloody amniotic fluid. POC explained, verbalizes understanding  0825 Ultrasound here to do bedside scan for fetal HR and FI.  0855 No fetal HR noted on ultrasound, DR. Martha Zaragoza notified, notifies pt. POC explained, verbalizes understanding. Pt. Up to BR to void. Won Husain [address] Infusion started. [address] [address] here with [facility] personnel to see pt. Pt. Agreeable to this. 1200 Resting on her side. S.O. at the bedside, supportive. 1240 Pt. Conts. To rest, no co pain at present. Cecilio 34. Martha Zaragoza in to see pt.  1500 Up to BR ad kailee. S.O conts. At the bedside. No co pain.

## 2021-12-22 NOTE — PROCEDURES
Patient was then placed in the dorsal lithotomy position. Speculum was placed. Large clot seen in the vaginal vault. Cerclage suture visualized. Cervix appeared close. Due to bleeding decision was made to remove the cerclage as to prevent any cervical trauma from  labor and cervical dilation. Suture was grasped with ring forceps and cut . Cerclage completely removed. Cervix FT on exam following removal.  Continual fluid seen from the cervical os. Patient tolerated procedure well. Plan of care discussed with patient  States comprehension.

## 2021-12-22 NOTE — PROGRESS NOTES
Spiritual Care Assessment/Progress Note  [facility]      NAME: Carlos Vallecillo      MRN: [id number]  AGE: 28 y.o. SEX: female  Quaker Affiliation: No preference   Language: English     2021     Total Time (in minutes): 60     Spiritual Assessment begun in [facility] 3 LABOR & DELIVERY through conversation with:         [x]Patient        [x] Family    [] Friend(s)        Reason for Consult: Initial/Spiritual assessment, patient floor,Family care     Spiritual beliefs: (Please include comment if needed)     [x] Identifies with a geraldine tradition:         [] Supported by a geraldine community:            [] Claims no spiritual orientation:           [] Seeking spiritual identity:                [] Adheres to an individual form of spirituality:           [] Not able to assess:                           Identified resources for coping:      [x] Prayer                               [] Music                  [] Guided Imagery     [x] Family/friends                 [] Pet visits     [] Devotional reading                         [] Unknown     [] Other:                                              Interventions offered during this visit: (See comments for more details)    Patient Interventions: Affirmation of emotions/emotional suffering,Affirmation of geraldine,Catharsis/review of pertinent events in supportive environment,Coping skills reviewed/reinforced, loss,Prayer (actual),Quaker beliefs/image of God discussed     Family/Friend(s):  Affirmation of geraldine,Affirmation of emotions/emotional suffering,Catharsis/review of pertinent events in supportive environment,Coping skills reviewed/reinforced,Guidance concerning next steps/process to be expected,Initial Assessment, loss,Prayer (actual),Quaker beliefs/image of God discussed     Plan of Care:     [] Support spiritual and/or cultural needs    [] Support AMD and/or advance care planning process      [] Support grieving process   [] Coordinate Rites and/or Rituals    [] Coordination with community clergy   [] No spiritual needs identified at this time   [] Detailed Plan of Care below (See Comments)  [] Make referral to Music Therapy  [] Make referral to Pet Therapy     [] Make referral to Addiction services  [] Make referral to [facility]  [] Make referral to Spiritual Care Partner  [] No future visits requested        [x] Contact Spiritual Care for further referrals     Comments: The purpose of the visit was in response to a FETAL DEMISE of the patient. The patient was being visited by her significant other. She shared how she had suffered a demise before, and she expressed feeling emotionally and spiritually tired. She mentioned that she is grateful for her significant other who has been by her side. He shared tearfully that he was trying to keep his self positive and encourage the mother of his child. They mentioned that were still trusting God, but don't know why this keeps happening. The  provided the ministry of spiritual care, listened empathically and reflectively, facilitated story-telling, offered prayer and a certificate for the recognition of life. [address] [facility] [employer]    can be reached by calling the  at [facility]  [phone]

## 2021-12-22 NOTE — PROGRESS NOTES
0045 Assumed care of this patient at this time. POC discussed to check ultrasound in am, repeat labs, continue antibiotics, pt aware to let writer know if pain increases, or large amount of fluids pass, or fetus. 0115 Covid swab colleted, and sent to lab, temp afebril, BP cuff removed for comfort will recheck q2.      0145 ice chips and clears provided. Pt aware not to get up out of bed, but to call for asst with bedpan. Pt aware next antibiotics due at 0300.      0130 pt resting in bed, s.o. at bedside in chair, no needs at present, bathroom offered. 0309 IV fluids new bag at rate of 125ml/hr and pcn dose given per [facility]. Bathroom offered, pt declined    2706 Patient requesting ginger ale, and ice. VS taken. Pt aware ultrasound will be done in room this am, and lab should be around shortly. 5619 Lab aware that patient needs labs, and that CMP was added on     0700 Bedside report to DEVAN Dobson RN

## 2021-12-22 NOTE — PROGRESS NOTES
2227: Dr. Deepak Guevara at bedside. Speculum exam being done by MD, large clot that was seen removed. Cervix appears closed per MD.   5489: Cervical cerclage removed. Procedure and findings were explained to pt and her s/o by MD. Plan of care discussed  Understanding verbalized. Pt comforted. 2314: Attempted to ascertain FHR with hand held doppler, possible  maternal HR picking up in the 80s. Pt updated. Pt allowed to express thoughts, therapeutic touch provided. 2351: Dr. Deepak Guevara updated re FHR tone, discussed monitoring fetal status. Pt for ultrasound in the morning to assess fetal status. Plan to monitor vaginal bleeding, CBC and lactic acid lashae. 0004: Pt using bedpan to void. Voided 600 ml. Clear yellow urine. Dominique-pad changed small amount of bloody fluid on dale pad. 0015:Pt uncomplaining at this time reports some pelvic pressure. Pt to inform Rn of pain or increase pressure, increase leaking of fluid or passing anything from vagina.

## 2021-12-22 NOTE — PROGRESS NOTES
OB PROGRESS NOTE    PROBLEM:  Fetal demise before 20 weeks gestation  Pregnancy at 18-5/7-week gestation  Cervical incompetence   PROM    SUBJECTIVE: Patient is doing well, has no complaints. VITALS:  Visit Vitals  /84   Pulse 89   Temp 98.8 °F (37.1 °C)   Resp 22   Ht 5' 7\" (1.702 m)   Wt 117.9 kg (260 lb)   SpO2 100%   BMI 40.72 kg/m²     HEART: Regular rhythm, no murmur  LUNGS: Clear to auscultation at both fields  ABDOMEN: Gravid  PELVIC: Cervical dilation: 2 cm, effacement: 70%, mild to moderate bleeding with blood clots noted    LABS:  Recent Results (from the past 24 hour(s))   CBC WITH AUTOMATED DIFF    Collection Time: 21  9:00 PM   Result Value Ref Range    WBC 13.0 (H) 3.6 - 11.0 K/uL    RBC 4.09 3.80 - 5.20 M/uL    HGB 11.4 (L) 11.5 - 16.0 g/dL    HCT 34.6 (L) 35.0 - 47.0 %    MCV 84.6 80.0 - 99.0 FL    MCH 27.9 26.0 - 34.0 PG    MCHC 32.9 30.0 - 36.5 g/dL    RDW 14.1 11.5 - 14.5 %    PLATELET 958 331 - 991 K/uL    MPV 10.8 8.9 - 12.9 FL    NRBC 0.0 0.0  WBC    ABSOLUTE NRBC 0.00 0.00 - 0.01 K/uL    NEUTROPHILS 68 32 - 75 %    LYMPHOCYTES 22 12 - 49 %    MONOCYTES 7 5 - 13 %    EOSINOPHILS 2 0 - 7 %    BASOPHILS 0 0 - 1 %    IMMATURE GRANULOCYTES 1 (H) 0 - 0.5 %    ABS. NEUTROPHILS 8.9 (H) 1.8 - 8.0 K/UL    ABS. LYMPHOCYTES 2.8 0.8 - 3.5 K/UL    ABS. MONOCYTES 0.9 0.0 - 1.0 K/UL    ABS. EOSINOPHILS 0.2 0.0 - 0.4 K/UL    ABS. BASOPHILS 0.0 0.0 - 0.1 K/UL    ABS. IMM.  GRANS. 0.1 (H) 0.00 - 0.04 K/UL    DF AUTOMATED     METABOLIC PANEL, COMPREHENSIVE    Collection Time: 21  9:00 PM   Result Value Ref Range    Sodium 138 136 - 145 mmol/L    Potassium 3.3 (L) 3.5 - 5.1 mmol/L    Chloride 105 97 - 108 mmol/L    CO2 25 21 - 32 mmol/L    Anion gap 8 5 - 15 mmol/L    Glucose 90 65 - 100 mg/dL    BUN 6 6 - 20 mg/dL    Creatinine 0.69 0.55 - 1.02 mg/dL    BUN/Creatinine ratio 9 (L) 12 - 20      GFR est AA >60 >60 ml/min/1.73m2    GFR est non-AA >60 >60 ml/min/1.73m2    Calcium 9.2 8.5 - 10.1 mg/dL    Bilirubin, total 0.2 0.2 - 1.0 mg/dL    AST (SGOT) 12 (L) 15 - 37 U/L    ALT (SGPT) 19 12 - 78 U/L    Alk. phosphatase 81 45 - 117 U/L    Protein, total 7.3 6.4 - 8.2 g/dL    Albumin 2.9 (L) 3.5 - 5.0 g/dL    Globulin 4.4 (H) 2.0 - 4.0 g/dL    A-G Ratio 0.7 (L) 1.1 - 2.2     TYPE & SCREEN    Collection Time: 21  9:00 PM   Result Value Ref Range    Crossmatch Expiration 2021,2359     ABO/Rh(D) A Positive     Antibody screen Negative    COVID-19 RAPID TEST    Collection Time: 21  1:20 AM   Result Value Ref Range    Specimen source Nasopharyngeal      COVID-19 rapid test Not Detected Not Detected     CBC WITH AUTOMATED DIFF    Collection Time: 21  7:00 AM   Result Value Ref Range    WBC 12.4 (H) 3.6 - 11.0 K/uL    RBC 3.83 3.80 - 5.20 M/uL    HGB 10.6 (L) 11.5 - 16.0 g/dL    HCT 32.5 (L) 35.0 - 47.0 %    MCV 84.9 80.0 - 99.0 FL    MCH 27.7 26.0 - 34.0 PG    MCHC 32.6 30.0 - 36.5 g/dL    RDW 14.2 11.5 - 14.5 %    PLATELET 637 686 - 189 K/uL    MPV 11.1 8.9 - 12.9 FL    NRBC 0.0 0.0  WBC    ABSOLUTE NRBC 0.00 0.00 - 0.01 K/uL    NEUTROPHILS 65 32 - 75 %    LYMPHOCYTES 27 12 - 49 %    MONOCYTES 5 5 - 13 %    EOSINOPHILS 2 0 - 7 %    BASOPHILS 0 0 - 1 %    IMMATURE GRANULOCYTES 1 (H) 0 - 0.5 %    ABS. NEUTROPHILS 8.1 (H) 1.8 - 8.0 K/UL    ABS. LYMPHOCYTES 3.3 0.8 - 3.5 K/UL    ABS. MONOCYTES 0.7 0.0 - 1.0 K/UL    ABS. EOSINOPHILS 0.2 0.0 - 0.4 K/UL    ABS. BASOPHILS 0.1 0.0 - 0.1 K/UL    ABS. IMM. GRANS. 0.1 (H) 0.00 - 0.04 K/UL    DF AUTOMATED     LACTIC ACID    Collection Time: 21  7:00 AM   Result Value Ref Range    Lactic acid 1.4 0.4 - 2.0 mmol/L       ASSESSMENT: 28years old -0-3-0 with pregnancy at 18-5/7-week gestation who was admitted yesterday after spontaneous rupture membranes. She had a cervical cerclage that was removed yesterday. Bedside ultrasound confirms fetal demise and severe oligohydramnios.   Patient oriented about findings, treatment options discussed.     PLAN:  Start Pitocin induction  Close follow-up

## 2021-12-22 NOTE — ED PROVIDER NOTES
EMERGENCY DEPARTMENT HISTORY AND PHYSICAL EXAM      Date: 12/21/2021  Patient Name: Allison Mckenzie    History of Presenting Illness     Chief Complaint   Patient presents with    Pregnancy Problem       History Provided By: Patient    HPI: Allison Mckenzie, 28 y.o. female with a past medical history significant For being G4, P0 at 18 weeks presents to the ED with cc of having acute onset gush of fluid with lower abdominal cramping and bleeding that began just prior to arrival.  Patient states she got a cerclage from Dr. Mary Araya a few days ago. Patient states that all of her other pregnancies have ended in miscarriage. Patient denies any fever, chills, nausea, vomiting, chest pain, shortness of breath, rash, diarrhea, headache night sweats. There are no other complaints, changes, or physical findings at this time. PCP: None    No current facility-administered medications on file prior to encounter. Current Outpatient Medications on File Prior to Encounter   Medication Sig Dispense Refill    calcium-cholecalciferol, d3, (CALCIUM 600 + D) 600-125 mg-unit tab Take  by mouth. (Patient not taking: Reported on 10/12/2021)         Past History     Past Medical History:  No past medical history on file. Past Surgical History:  No past surgical history on file. Family History:  No family history on file. Social History:  Social History     Tobacco Use    Smoking status: Never Smoker    Smokeless tobacco: Never Used   Vaping Use    Vaping Use: Never used   Substance Use Topics    Alcohol use: Never    Drug use: Never       Allergies:  No Known Allergies      Review of Systems     Review of Systems   Constitutional: Negative. Negative for appetite change, chills, fatigue and fever. HENT: Negative. Negative for congestion and sinus pain. Eyes: Negative. Negative for pain and visual disturbance. Respiratory: Negative. Negative for chest tightness and shortness of breath.     Cardiovascular: Negative. Negative for chest pain. Gastrointestinal: Negative. Negative for abdominal pain, diarrhea, nausea and vomiting. Genitourinary: Positive for pelvic pain, vaginal bleeding and vaginal discharge. Negative for difficulty urinating. No discharge   Musculoskeletal: Negative. Negative for arthralgias. Skin: Negative. Negative for rash. Neurological: Negative. Negative for weakness and headaches. Hematological: Negative. Psychiatric/Behavioral: Negative. Negative for agitation. The patient is not nervous/anxious. All other systems reviewed and are negative. Physical Exam     Physical Exam  Vitals and nursing note reviewed. Constitutional:       General: She is not in acute distress. Appearance: She is well-developed. HENT:      Head: Normocephalic and atraumatic. Nose: Nose normal.      Mouth/Throat:      Mouth: Mucous membranes are moist.      Pharynx: Oropharynx is clear. No oropharyngeal exudate. Eyes:      General:         Right eye: No discharge. Left eye: No discharge. Conjunctiva/sclera: Conjunctivae normal.      Pupils: Pupils are equal, round, and reactive to light. Cardiovascular:      Rate and Rhythm: Normal rate and regular rhythm. Chest Wall: PMI is not displaced. No thrill. Heart sounds: Normal heart sounds. No murmur heard. No friction rub. No gallop. Pulmonary:      Effort: Pulmonary effort is normal. No respiratory distress. Breath sounds: Normal breath sounds. No wheezing or rales. Chest:      Chest wall: No tenderness. Abdominal:      General: Bowel sounds are normal. There is no distension. Palpations: Abdomen is soft. There is no mass. Tenderness: There is no abdominal tenderness. There is no guarding or rebound. Genitourinary:     Vagina: Vaginal discharge present. Comments: Exquisitely tender on pelvic exam and could not continue. Musculoskeletal:         General: Normal range of motion. Cervical back: Normal range of motion and neck supple. Lymphadenopathy:      Cervical: No cervical adenopathy. Skin:     General: Skin is warm and dry. Capillary Refill: Capillary refill takes less than 2 seconds. Findings: No erythema or rash. Neurological:      Mental Status: She is alert and oriented to person, place, and time. Cranial Nerves: No cranial nerve deficit. Coordination: Coordination normal.   Psychiatric:         Mood and Affect: Mood normal.         Behavior: Behavior normal.         Lab and Diagnostic Study Results     Labs -   No results found for this or any previous visit (from the past 12 hour(s)). Radiologic Studies -   @lastxrresult@  CT Results  (Last 48 hours)    None        CXR Results  (Last 48 hours)    None            Medical Decision Making   - I am the first provider for this patient. - I reviewed the vital signs, available nursing notes, past medical history, past surgical history, family history and social history. - Initial assessment performed. The patients presenting problems have been discussed, and they are in agreement with the care plan formulated and outlined with them. I have encouraged them to ask questions as they arise throughout their visit. Vital Signs-Reviewed the patient's vital signs. No data found. Records Reviewed: Nursing Notes    ED Course:     ED Course as of 12/24/21 1019   Tue Dec 21, 2021   2105 I reached out to Dr. Leonor Hatchet for further guidance. [CS]   2129 Discussed the case with OB/GYN who is recommended an ultrasound and will reevaluate. We will hang LR on this patient at this time [CS]   2208 Dr. Leonor Hatchet has evaluated the patient will accept upstairs.  [CS]      ED Course User Index  [CS] Laura Ibarra MD       Provider Notes (Medical Decision Making):   Patient's physician has come down to the emergency room will be admitting her upstairs  MDM       Procedures   Medical Decision Makingedical Decision Making  Performed by: Kacey Burger MD  PROCEDURES:  Procedures       Disposition   Disposition: Condition stable    Send to L&D    DISCHARGE PLAN:  1. Current Discharge Medication List      CONTINUE these medications which have NOT CHANGED    Details   !! terconazole (TERAZOL 7) 0.4 % vaginal cream Insert 1 Applicator into vagina nightly. Qty: 45 g, Refills: 0    Associated Diagnoses: Vaginitis and vulvovaginitis      acetaminophen (TYLENOL) 325 mg tablet Take 2 Tablets by mouth every six (6) hours as needed for Pain. Qty: 20 Tablet, Refills: 0      aspirin delayed-release 81 mg tablet Take  by mouth daily. calcium-cholecalciferol, d3, (CALCIUM 600 + D) 600-125 mg-unit tab Take  by mouth. ferrous sulfate 325 mg (65 mg iron) tablet Take  by mouth Daily (before breakfast). fluconazole (DIFLUCAN) 150 mg tablet Take 150 mg by mouth daily. FDA advises cautious prescribing of oral fluconazole in pregnancy. !! ibuprofen (MOTRIN) 600 mg tablet Take  by mouth every six (6) hours as needed for Pain. !! ibuprofen (MOTRIN) 800 mg tablet Take  by mouth. hydroxyprogesterone, PF, (Pat, PF,) 275 mg/1.1 mL atIn subcutaneous injection 275 mg by SubCUTAneous route every seven (7) days. metoclopramide HCl (REGLAN) 10 mg tablet Take 10 mg by mouth Before breakfast, lunch, dinner and at bedtime. metroNIDAZOLE (FLAGYL) 500 mg tablet Take  by mouth three (3) times daily. oseltamivir (TAMIFLU) 75 mg capsule Take  by mouth. oxyCODONE-acetaminophen (PERCOCET) 5-325 mg per tablet Take  by mouth every four (4) hours as needed for Pain.      prenatal vitamin (Prenatal) 28 mg iron- 800 mcg tab tablet Take 1 Tab by mouth daily. norgestimate-ethinyl estradioL (Sprintec, 28,) 0.25-35 mg-mcg tab Take 1 Tab by mouth daily. !! terconazole (TERAZOL 7) 0.4 % vaginal cream Insert 1 Applicator into vagina nightly. !! - Potential duplicate medications found.  Please discuss with provider. 2.   Follow-up Information     Follow up With Specialties Details Why Contact Info    Alissa Whyte MD Obstetrics & Gynecology In 2 weeks mood check s/p [facility] [address] [place] [address]  [phone]      None    None (750) Patient stated that they have no PCP          3. Return to ED if worse   4. Discharge Medication List as of 12/23/2021  2:45 PM      CONTINUE these medications which have CHANGED    Details   ibuprofen (MOTRIN) 800 mg tablet Take 1 Tablet by mouth every eight (8) hours as needed for Pain.  Indications: a type of joint disorder due to excess uric acid in the blood called gout, Normal, Disp-40 Tablet, R-0         CONTINUE these medications which have NOT CHANGED    Details   calcium-cholecalciferol, d3, (CALCIUM 600 + D) 600-125 mg-unit tab Take  by mouth., Historical Med         STOP taking these medications       terconazole (TERAZOL 7) 0.4 % vaginal cream Comments:   Reason for Stopping:         acetaminophen (TYLENOL) 325 mg tablet Comments:   Reason for Stopping:         aspirin delayed-release 81 mg tablet Comments:   Reason for Stopping:         ferrous sulfate 325 mg (65 mg iron) tablet Comments:   Reason for Stopping:         fluconazole (DIFLUCAN) 150 mg tablet Comments:   Reason for Stopping:         hydroxyprogesterone, PF, (Kerrick, PF,) 275 mg/1.1 mL atIn subcutaneous injection Comments:   Reason for Stopping:         metoclopramide HCl (REGLAN) 10 mg tablet Comments:   Reason for Stopping:         metroNIDAZOLE (FLAGYL) 500 mg tablet Comments:   Reason for Stopping:         oseltamivir (TAMIFLU) 75 mg capsule Comments:   Reason for Stopping:         oxyCODONE-acetaminophen (PERCOCET) 5-325 mg per tablet Comments:   Reason for Stopping:         prenatal vitamin (Prenatal) 28 mg iron- 800 mcg tab tablet Comments:   Reason for Stopping:         norgestimate-ethinyl estradioL (Sprintec, 28,) 0.25-35 mg-mcg tab Comments:   Reason for Stopping: terconazole (TERAZOL 7) 0.4 % vaginal cream Comments:   Reason for Stopping:                 Diagnosis     Clinical Impression:   1. Inevitable complete miscarriage without complication    2. 18 weeks gestation of pregnancy        Attestations:    Felicia Sullivan MD    Please note that this dictation was completed with [employer], the computer voice recognition software. Quite often unanticipated grammatical, syntax, homophones, and other interpretive errors are inadvertently transcribed by the computer software. Please disregard these errors. Please excuse any errors that have escaped final proofreading. Thank you.

## 2021-12-22 NOTE — H&P
History & Physical    Name: Arelis Dominguez MRN: [id number]  SSN: [id number]    YOB: 1990  Age: 28 y.o. Sex: female        Subjective:     Estimated Date of Delivery: 22  OB History    Para Term  AB Living   4       3     SAB IAB Ectopic Molar Multiple Live Births   3                # Outcome Date GA Lbr Srini/2nd Weight Sex Delivery Anes PTL Lv   4 Current            3 SAB      SAB      2 SAB      SAB      1 SAB      SAB          Ms. Amada Phillips is admitted with pregnancy at 18w4d for SROM. Patient reports around 8pm she was was laying down when she felt a sudden episode of pressure and \" my water broke. \"  History of second trimester loss. s/p cerclge placement by MFM. Patient had been on pelvic rest.  Reports fetal movement, . Reports vaginal bleeding. P Please see prenatal records for details. Patient arrived to the [address] [facility] shortly after where Ultrasound reveal oligohydramnios. No past medical history on file. No past surgical history on file. Social History     Occupational History    Not on file   Tobacco Use    Smoking status: Never Smoker    Smokeless tobacco: Never Used   Vaping Use    Vaping Use: Never used   Substance and Sexual Activity    Alcohol use: Never    Drug use: Never    Sexual activity: Yes     Partners: Male     Birth control/protection: None     No family history on file. No Known Allergies  Prior to Admission medications    Medication Sig Start Date End Date Taking? Authorizing Provider   terconazole (TERAZOL 7) 0.4 % vaginal cream Insert 1 Applicator into vagina nightly. Patient not taking: Reported on 2021 10/25/21   Aparna Naranjo MD   acetaminophen (TYLENOL) 325 mg tablet Take 2 Tablets by mouth every six (6) hours as needed for Pain. Patient not taking: Reported on 10/12/2021 10/3/21   Uriel Koch MD   aspirin delayed-release 81 mg tablet Take  by mouth daily.   Patient not taking: Reported on 10/12/2021    Provider, [place] calcium-cholecalciferol, d3, (CALCIUM 600 + D) 600-125 mg-unit tab Take  by mouth. Patient not taking: Reported on 10/12/2021    Provider, Historical   ferrous sulfate 325 mg (65 mg iron) tablet Take  by mouth Daily (before breakfast). Patient not taking: Reported on 10/12/2021    Provider, Historical   fluconazole (DIFLUCAN) 150 mg tablet Take 150 mg by mouth daily. FDA advises cautious prescribing of oral fluconazole in pregnancy. Patient not taking: Reported on 10/12/2021    Provider, Historical   ibuprofen (MOTRIN) 600 mg tablet Take  by mouth every six (6) hours as needed for Pain. Patient not taking: Reported on 10/12/2021    Provider, Historical   ibuprofen (MOTRIN) 800 mg tablet Take  by mouth. Patient not taking: Reported on 10/12/2021    Provider, Historical   hydroxyprogesterone, PF, (Pat, PF,) 275 mg/1.1 mL atIn subcutaneous injection 275 mg by SubCUTAneous route every seven (7) days. Patient not taking: Reported on 10/12/2021    Provider, Historical   metoclopramide HCl (REGLAN) 10 mg tablet Take 10 mg by mouth Before breakfast, lunch, dinner and at bedtime. Patient not taking: Reported on 10/12/2021    Provider, Historical   metroNIDAZOLE (FLAGYL) 500 mg tablet Take  by mouth three (3) times daily. Patient not taking: Reported on 10/12/2021    Provider, Historical   oseltamivir (TAMIFLU) 75 mg capsule Take  by mouth. Patient not taking: Reported on 10/12/2021    Provider, Historical   oxyCODONE-acetaminophen (PERCOCET) 5-325 mg per tablet Take  by mouth every four (4) hours as needed for Pain. Patient not taking: Reported on 10/12/2021    Provider, Historical   prenatal vitamin (Prenatal) 28 mg iron- 800 mcg tab tablet Take 1 Tab by mouth daily. Patient not taking: Reported on 12/13/2021    Provider, Historical   norgestimate-ethinyl estradioL (Sprintec, 28,) 0.25-35 mg-mcg tab Take 1 Tab by mouth daily.   Patient not taking: Reported on 10/12/2021    Provider, Historical   terconazole (TERAZOL 7) 0.4 % vaginal cream Insert 1 Applicator into vagina nightly. Patient not taking: Reported on 10/12/2021    Provider, Historical        Review of Systems: A comprehensive review of systems was negative except for that written in the HPI. Objective:     Vitals:  Vitals:    12/21/21 2055   BP: (!) 160/80   Pulse: 94   Resp: 19   Temp: 99.1 °F (37.3 °C)   SpO2: 100%   Weight: 260 lb (117.9 kg)   Height: 5' 7\" (1.702 m)        Physical Exam:  Patient without distress. Abdomen: soft, nontender  Fundus: soft and non tender  Perineum: blood present, amniotic fluid present  Cervical Exam: cerclage in place, cervix close, clots in the vagina  Membranes:  Spontaneous Rupture of Membranes; Amniotic Fluid: clear fluid  Fetal Heart Rate: per ultrasound FHR presents - bradycardia         Transabdominal study shows a single intrauterine pregnancy with heart rate 96. Calculated gestational age of 24 weeks 5 days places the TUSHAR of 5/15/2022. Placenta is homogeneous, posterior position of normal thickness. Patient  presents with premature rupture of membranes in the amniotic fluid volume is  diminished, 1.8 cm. Fetal ratios are appropriate. No subchorionic, myometrial  adnexal finding    Prenatal Labs:   Lab Results   Component Value Date/Time    ABO/Rh(D) A Positive 12/21/2021 09:00 PM         Assessment/Plan:     Active Problems:    * No active hospital problems. *       Plan: Admit for PPROM at 18+ weeks, previable fetus, FHR present. IV fluids , antibiotics, removal of cerclage in light of bleeding/ prevention of cervical trauma due to labor and cervical dilation. Plan of care reviewed and discussed with patient and spouse. States comprehension. Patient and spouse emotional as this will be their 4th loss. All questions answered.

## 2021-12-22 NOTE — ED NOTES
TRANSFER - OUT REPORT:    Verbal report given to Simon Hoff RN (name) on [facility]  being transferred to L&D (unit) for routine progression of care       Report consisted of patients Situation, Background, Assessment and   Recommendations(SBAR). Information from the following report(s) SBAR and MAR was reviewed with the receiving nurse. Lines:   Peripheral IV 12/21/21 Left Antecubital (Active)   Site Assessment Clean, dry, & intact 12/21/21 2103   Phlebitis Assessment 0 12/21/21 2103   Infiltration Assessment 0 12/21/21 2103   Action Taken Blood drawn 12/21/21 2103        Opportunity for questions and clarification was provided.       Patient transported with:   Monitor  Registered Nurse no anomalies noted

## 2021-12-23 VITALS
SYSTOLIC BLOOD PRESSURE: 117 MMHG | TEMPERATURE: 98 F | RESPIRATION RATE: 18 BRPM | DIASTOLIC BLOOD PRESSURE: 68 MMHG | HEIGHT: 67 IN | BODY MASS INDEX: 40.81 KG/M2 | WEIGHT: 260 LBS | HEART RATE: 89 BPM | OXYGEN SATURATION: 100 %

## 2021-12-23 PROBLEM — O42.111 PRETERM PREMATURE RUPTURE OF MEMBRANES (PPROM) WITH ONSET OF LABOR AFTER 24 HOURS OF RUPTURE IN FIRST TRIMESTER, ANTEPARTUM: Status: RESOLVED | Noted: 2021-12-22 | Resolved: 2021-12-23

## 2021-12-23 PROBLEM — O03.9 INEVITABLE COMPLETE MISCARRIAGE WITHOUT COMPLICATION: Status: RESOLVED | Noted: 2021-12-23 | Resolved: 2021-12-23

## 2021-12-23 PROBLEM — Z3A.18 18 WEEKS GESTATION OF PREGNANCY: Status: RESOLVED | Noted: 2021-12-22 | Resolved: 2021-12-23

## 2021-12-23 PROBLEM — O03.9 INEVITABLE COMPLETE MISCARRIAGE WITHOUT COMPLICATION: Status: ACTIVE | Noted: 2021-12-23

## 2021-12-23 PROBLEM — O42.919 PRETERM PREMATURE RUPTURE OF MEMBRANES: Status: RESOLVED | Noted: 2020-09-10 | Resolved: 2021-12-23

## 2021-12-23 PROBLEM — O02.1 FETAL DEMISE BEFORE 20 WEEKS WITH RETENTION OF DEAD FETUS: Status: RESOLVED | Noted: 2021-12-22 | Resolved: 2021-12-23

## 2021-12-23 LAB
AMPHET UR QL SCN: NEGATIVE
APPEARANCE UR: CLEAR
BACTERIA URNS QL MICRO: NEGATIVE /HPF
BARBITURATES UR QL SCN: NEGATIVE
BENZODIAZ UR QL: NEGATIVE
BILIRUB UR QL: NEGATIVE
CANNABINOIDS UR QL SCN: NEGATIVE
CAOX CRY URNS QL MICRO: ABNORMAL
COCAINE UR QL SCN: NEGATIVE
COLOR UR: ABNORMAL
DRUG SCRN COMMENT,DRGCM: NORMAL
GLUCOSE UR STRIP.AUTO-MCNC: 50 MG/DL
HGB UR QL STRIP: ABNORMAL
KETONES UR QL STRIP.AUTO: NEGATIVE MG/DL
LEUKOCYTE ESTERASE UR QL STRIP.AUTO: NEGATIVE
METHADONE UR QL: NEGATIVE
MUCOUS THREADS URNS QL MICRO: ABNORMAL /LPF
NITRITE UR QL STRIP.AUTO: NEGATIVE
OPIATES UR QL: NEGATIVE
PCP UR QL: NEGATIVE
PH UR STRIP: 8 [PH] (ref 5–8)
PROT UR STRIP-MCNC: NEGATIVE MG/DL
RBC #/AREA URNS HPF: ABNORMAL /HPF (ref 0–5)
SP GR UR REFRACTOMETRY: 1.01 (ref 1–1.03)
UA: UC IF INDICATED,UAUC: ABNORMAL
UROBILINOGEN UR QL STRIP.AUTO: 0.1 EU/DL (ref 0.1–1)
WBC URNS QL MICRO: ABNORMAL /HPF (ref 0–4)

## 2021-12-23 PROCEDURE — 80307 DRUG TEST PRSMV CHEM ANLYZR: CPT

## 2021-12-23 PROCEDURE — 88305 TISSUE EXAM BY PATHOLOGIST: CPT

## 2021-12-23 PROCEDURE — 75410000000 HC DELIVERY VAGINAL/SINGLE

## 2021-12-23 PROCEDURE — 75410000002 HC LABOR FEE PER 1 HR

## 2021-12-23 PROCEDURE — 74011250637 HC RX REV CODE- 250/637: Performed by: OBSTETRICS & GYNECOLOGY

## 2021-12-23 PROCEDURE — 59414 DELIVER PLACENTA: CPT | Performed by: OBSTETRICS & GYNECOLOGY

## 2021-12-23 PROCEDURE — 88307 TISSUE EXAM BY PATHOLOGIST: CPT

## 2021-12-23 PROCEDURE — 88300 SURGICAL PATH GROSS: CPT

## 2021-12-23 PROCEDURE — 99231 SBSQ HOSP IP/OBS SF/LOW 25: CPT | Performed by: OBSTETRICS & GYNECOLOGY

## 2021-12-23 PROCEDURE — 74011250636 HC RX REV CODE- 250/636: Performed by: OBSTETRICS & GYNECOLOGY

## 2021-12-23 PROCEDURE — 75410000003 HC RECOV DEL/VAG/CSECN EA 0.5 HR

## 2021-12-23 PROCEDURE — 81001 URINALYSIS AUTO W/SCOPE: CPT

## 2021-12-23 RX ORDER — ZOLPIDEM TARTRATE 5 MG/1
5 TABLET ORAL
Status: DISCONTINUED | OUTPATIENT
Start: 2021-12-23 | End: 2021-12-23 | Stop reason: HOSPADM

## 2021-12-23 RX ORDER — HYDROCORTISONE ACETATE PRAMOXINE HCL 2.5; 1 G/100G; G/100G
CREAM TOPICAL AS NEEDED
Status: DISCONTINUED | OUTPATIENT
Start: 2021-12-23 | End: 2021-12-23 | Stop reason: HOSPADM

## 2021-12-23 RX ORDER — NALOXONE HYDROCHLORIDE 0.4 MG/ML
0.4 INJECTION, SOLUTION INTRAMUSCULAR; INTRAVENOUS; SUBCUTANEOUS AS NEEDED
Status: DISCONTINUED | OUTPATIENT
Start: 2021-12-23 | End: 2021-12-23 | Stop reason: HOSPADM

## 2021-12-23 RX ORDER — OXYTOCIN/RINGER'S LACTATE 30/500 ML
10 PLASTIC BAG, INJECTION (ML) INTRAVENOUS AS NEEDED
Status: COMPLETED | OUTPATIENT
Start: 2021-12-23 | End: 2021-12-23

## 2021-12-23 RX ORDER — IBUPROFEN 800 MG/1
800 TABLET ORAL
Status: DISCONTINUED | OUTPATIENT
Start: 2021-12-23 | End: 2021-12-23 | Stop reason: HOSPADM

## 2021-12-23 RX ORDER — DOCUSATE SODIUM 100 MG/1
100 CAPSULE, LIQUID FILLED ORAL
Status: DISCONTINUED | OUTPATIENT
Start: 2021-12-23 | End: 2021-12-23 | Stop reason: HOSPADM

## 2021-12-23 RX ORDER — OXYCODONE AND ACETAMINOPHEN 5; 325 MG/1; MG/1
1 TABLET ORAL
Status: DISCONTINUED | OUTPATIENT
Start: 2021-12-23 | End: 2021-12-23 | Stop reason: HOSPADM

## 2021-12-23 RX ORDER — OXYTOCIN/RINGER'S LACTATE 30/500 ML
87.3 PLASTIC BAG, INJECTION (ML) INTRAVENOUS AS NEEDED
Status: DISCONTINUED | OUTPATIENT
Start: 2021-12-23 | End: 2021-12-23 | Stop reason: HOSPADM

## 2021-12-23 RX ORDER — IBUPROFEN 800 MG/1
800 TABLET ORAL
Qty: 40 TABLET | Refills: 0 | Status: SHIPPED | OUTPATIENT
Start: 2021-12-23

## 2021-12-23 RX ADMIN — BUTORPHANOL TARTRATE 1 MG: 1 INJECTION, SOLUTION INTRAMUSCULAR; INTRAVENOUS at 04:13

## 2021-12-23 RX ADMIN — OXYCODONE AND ACETAMINOPHEN 1 TABLET: 5; 325 TABLET ORAL at 05:51

## 2021-12-23 RX ADMIN — IBUPROFEN 800 MG: 800 TABLET, FILM COATED ORAL at 09:00

## 2021-12-23 RX ADMIN — OXYCODONE AND ACETAMINOPHEN 1 TABLET: 5; 325 TABLET ORAL at 12:41

## 2021-12-23 RX ADMIN — PENICILLIN G 3 MILLION UNITS: 3000000 INJECTION, SOLUTION INTRAVENOUS at 00:12

## 2021-12-23 RX ADMIN — Medication 10000 MILLI-UNITS: at 04:40

## 2021-12-23 NOTE — PROGRESS NOTES
1: Postoral care update about pt, that she is now delivered. Spoke with Yg Davis, states they will round on patient later in the morning, pt prefers not to be seen at this moment.

## 2021-12-23 NOTE — PROGRESS NOTES
At this early hour patient did not want a visit. She ask for maybe later today. Advised nurse to [facility] for any [facility] ref[facility].     [address]    Please [facility]  in order to get in touch with  for any Spiritual Care Needs   [phone]   OR   Reach out to us on [facility]

## 2021-12-23 NOTE — DISCHARGE SUMMARY
Obstetrical Discharge Summary     Name: Frances Francis MRN: [id number]  SSN: [id number]    YOB: 1990  Age: 28 y.o. Sex: female      Admit Date: 2021    Discharge Date: 2021     Admitting Physician: Parveen Erickson MD     Attending Physician:  No att. providers found     Admission Diagnoses: Pregnant [Z34.90];  premature rupture of membranes (PPROM) with onset of labor after 24 hours of rupture in first trimester, antepartum [O42.111]    Discharge Diagnoses:   Information for the patient's :  Harvey Sher, Pending FD [[id number]]   Delivery of a 0.21 kg child infant via Vaginal, Spontaneous on 2021 at 4:05 AM  by Chidi Kim. Apgars were 0  and 0 . Additional Diagnoses:   Hospital Problems  Date Reviewed: 2021          Codes Class Noted POA    Cervical incompetence ICD-10-CM: N88.3  ICD-9-CM: 622.5  2021 Yes           No results found for: RUBELLAEXT, GRBSEXT    Recent Results (from the past 24 hour(s))   CBC WITH AUTOMATED DIFF     Collection Time: 21  9:00 PM   Result Value Ref Range     WBC 13.0 (H) 3.6 - 11.0 K/uL     RBC 4.09 3.80 - 5.20 M/uL     HGB 11.4 (L) 11.5 - 16.0 g/dL     HCT 34.6 (L) 35.0 - 47.0 %     MCV 84.6 80.0 - 99.0 FL     MCH 27.9 26.0 - 34.0 PG     MCHC 32.9 30.0 - 36.5 g/dL     RDW 14.1 11.5 - 14.5 %     PLATELET 569 380 - 548 K/uL     MPV 10.8 8.9 - 12.9 FL     NRBC 0.0 0.0  WBC     ABSOLUTE NRBC 0.00 0.00 - 0.01 K/uL     NEUTROPHILS 68 32 - 75 %     LYMPHOCYTES 22 12 - 49 %     MONOCYTES 7 5 - 13 %     EOSINOPHILS 2 0 - 7 %     BASOPHILS 0 0 - 1 %     IMMATURE GRANULOCYTES 1 (H) 0 - 0.5 %     ABS. NEUTROPHILS 8.9 (H) 1.8 - 8.0 K/UL     ABS. LYMPHOCYTES 2.8 0.8 - 3.5 K/UL     ABS. MONOCYTES 0.9 0.0 - 1.0 K/UL     ABS. EOSINOPHILS 0.2 0.0 - 0.4 K/UL     ABS. BASOPHILS 0.0 0.0 - 0.1 K/UL     ABS. IMM.  GRANS. 0.1 (H) 0.00 - 0.04 K/UL     DF AUTOMATED     METABOLIC PANEL, COMPREHENSIVE     Collection Time: 21 9:00 PM   Result Value Ref Range     Sodium 138 136 - 145 mmol/L     Potassium 3.3 (L) 3.5 - 5.1 mmol/L     Chloride 105 97 - 108 mmol/L     CO2 25 21 - 32 mmol/L     Anion gap 8 5 - 15 mmol/L     Glucose 90 65 - 100 mg/dL     BUN 6 6 - 20 mg/dL     Creatinine 0.69 0.55 - 1.02 mg/dL     BUN/Creatinine ratio 9 (L) 12 - 20       GFR est AA >60 >60 ml/min/1.73m2     GFR est non-AA >60 >60 ml/min/1.73m2     Calcium 9.2 8.5 - 10.1 mg/dL     Bilirubin, total 0.2 0.2 - 1.0 mg/dL     AST (SGOT) 12 (L) 15 - 37 U/L     ALT (SGPT) 19 12 - 78 U/L     Alk. phosphatase 81 45 - 117 U/L     Protein, total 7.3 6.4 - 8.2 g/dL     Albumin 2.9 (L) 3.5 - 5.0 g/dL     Globulin 4.4 (H) 2.0 - 4.0 g/dL     A-G Ratio 0.7 (L) 1.1 - 2.2     TYPE & SCREEN     Collection Time: 12/21/21  9:00 PM   Result Value Ref Range     Crossmatch Expiration 12/24/2021,2359       ABO/Rh(D) A Positive       Antibody screen Negative     COVID-19 RAPID TEST     Collection Time: 12/22/21  1:20 AM   Result Value Ref Range     Specimen source Nasopharyngeal       COVID-19 rapid test Not Detected Not Detected     CBC WITH AUTOMATED DIFF     Collection Time: 12/22/21  7:00 AM   Result Value Ref Range     WBC 12.4 (H) 3.6 - 11.0 K/uL     RBC 3.83 3.80 - 5.20 M/uL     HGB 10.6 (L) 11.5 - 16.0 g/dL     HCT 32.5 (L) 35.0 - 47.0 %     MCV 84.9 80.0 - 99.0 FL     MCH 27.7 26.0 - 34.0 PG     MCHC 32.6 30.0 - 36.5 g/dL     RDW 14.2 11.5 - 14.5 %     PLATELET 634 209 - 071 K/uL     MPV 11.1 8.9 - 12.9 FL     NRBC 0.0 0.0  WBC     ABSOLUTE NRBC 0.00 0.00 - 0.01 K/uL     NEUTROPHILS 65 32 - 75 %     LYMPHOCYTES 27 12 - 49 %     MONOCYTES 5 5 - 13 %     EOSINOPHILS 2 0 - 7 %     BASOPHILS 0 0 - 1 %     IMMATURE GRANULOCYTES 1 (H) 0 - 0.5 %     ABS. NEUTROPHILS 8.1 (H) 1.8 - 8.0 K/UL     ABS. LYMPHOCYTES 3.3 0.8 - 3.5 K/UL     ABS. MONOCYTES 0.7 0.0 - 1.0 K/UL     ABS. EOSINOPHILS 0.2 0.0 - 0.4 K/UL     ABS. BASOPHILS 0.1 0.0 - 0.1 K/UL     ABS. IMM.  GRANS. 0.1 (H) 0.00 - 0.04 K/UL     DF AUTOMATED     LACTIC ACID     Collection Time: 12/22/21  7:00 AM   Result Value Ref Range     Lactic acid 1.4 0.4 - 2.0 mmol/L       Hospital Course: Patient is a 27 yo G4 now  s/p PPROM and subsequent FDIU on 12/21/21. Pt with history of cervical incompetence and had cerclage in place. It had been removed by M prior to PPROM. She delivered vaginally at 18+5wga on 12/23/21. There were no complications. She desired discharge to home on post partum day 0 and was deemed stable for discharge to home. Disposition: Home  Condition: Good    Patient Instructions:     Reference my discharge instructions. Follow-up Appointments   Procedures    FOLLOW UP VISIT Appointment in: Two Weeks     Standing Status:   Standing     Number of Occurrences:   1     Order Specific Question:   Appointment in     Answer:    Two Weeks        Signed By:  Sanam Bazan MD     December 23, 2021

## 2021-12-23 NOTE — L&D DELIVERY NOTE
DELIVERY NOTE    PREOPERATIVE DIAGNOSIS:  Fetal demise before 20 weeks gestation  Pregnancy at 18-6/7-week gestation  Cervical incompetence   PROM    POSTOPERATIVE DIAGNOSIS:   Fetal demise before 20 weeks gestation  Pregnancy at 18-6/7-week gestation  Cervical incompetence   PROM    PROCEDURE:  Miscarriage, spontaneous  Placental removal    ANESTHESIA: None    ANESTHESIOLOGIST:N/A    SURGEON: Arcelia Vallecillo M.D.    ASSISTANT:N/A    COMPLICATIONS: None    CONDITION DURING PROCEDURE: Stable    ESTIMATED BLOOD LOSS:152 mL    SURGICAL COUNT:Correct    SPECIMEN: Fetus, cord and placenta    FINDINGS: Spontaneous delivery of fetus secondary to incompetent cervix. DESCRIPTION OF PROCEDURE: The patient was admitted yesterday with pregnancy at 18-5/7 weeks gestation after spontaneous rupture membranes. She was already 2 cm dilated. Pitocin was started and she eventually delivered at [address]. A medium speculum was placed, placenta grasped with ring forceps and completely removed applying gentle traction. No cervical, vaginal or perineal laceration noted. Uterus noted well contracted and not actively bleeding. Mother is doing well, will be transferred to postpartum mann.

## 2021-12-23 NOTE — PROGRESS NOTES
Received patient from L and D via bed to room 309 accompanied by [facility] RN. Patient is s/p fetal demise. Condolences given. Hourly rounding info and white board information provided and reviewed with patient. Patient oriented to room along with unit procedures. Patient verbalized understanding of information given. Assessment and vitals completed- see flowsheet. Call bell in reach. Provided patient with grief/bereavement folder.

## 2021-12-23 NOTE — PROGRESS NOTES
Discharge plan of care/case management plan validated with provider discharge order. Discharge instructions printed out and provided to patient. Patient verbalized instructions explained to her with all questions answered. Patient aware of follow-up OB appointment. Patient knows when to call MD or 911. Patient preferred not to be discharged via wheelchair and walked off unit with her  at this time.

## 2021-12-23 NOTE — PROGRESS NOTES
The purpose of the was in response to patient request. The patient was resting at the time of the visit, however her significant other was in the room. He shared that she was resting peacefully and though the visit could done later.  listened and respected the request for a later visit. [address] [facility] Ara Carter.    can be reached by calling the  at [facility]  [phone]

## 2021-12-23 NOTE — ROUTINE PROCESS
1910 Assumed care of this patient. Patient with some cramping, but denies pain. Pt extremely hungry, popsicle and gingerale offered. 1925 Per Dr Martha Zaragoza pt may have small snack. 2000 Shift assessment done    2100 Pain assessed, bathroom offered. VS taken. Dr Martha Zaragoza updated that patient denies pain, has small bleeding noted, and vs are WNL. 2130 Quarter sized blood clot noted, pad changed    2250 Pain assessed. Pt reports no more bleeding, fluids offered, bathroom offered. 2300 Pitocin increased to 24mu/hr    0012 Vs taken, pain assessed as zero, pad noted to have no blood. Pt reports 1 void in toilet no asst needed. PCN hung as noted in MAR    0230 Pt resting in bed, deep even breaths noted, eyes closed. S.O. at side. 0350 Pt requesting pain medication    0355 Writer to rrom, toco adjusted. Pt stating she has to pee.    0405 Pt on toilet attempting to void. States she thinks a \"clot is coming out\". Fetal head noted at vagina, fetus delivered into writers hands. Cord clamped. Fetus placed in warmer. 0410 Pt back to bed. Opportunity to hold fetus given, pt declined, would only like a photograph to remember fetus. 0413 Stadol given at patient request for pain    0420 Dr Martha Zaragoza aware pt has delivered fetus, will come to bedside for placenta. 0440 Placenta removed, pit bolused at 999 for 11 mins    0451 Pitocin decreased to rate of 87.3 ml/hr per Dr Angela Acosta called, pt does not meet criteria if less than 35 weeks gestational age, and body may be released per automated response. 0530  on call notified that pt has delivered. 0545 Pitocin new bag hung at rate of 87.3ml/hr  Pictures of infant shown to patient. Pt does not want to see or hold infant, okay to send to pathology     0551 Pain medication given pain of 8/10    0600 Peripad changed, small bleeding noted. Pt aware she may have food, but declines at present.     0715   Bedside report given to [facility] RN

## 2021-12-23 NOTE — PROGRESS NOTES
0715: Bedside shift report received from Cassandra Cain RN. Patient found sitting in bed resting quietly. Patient has no complaints at this time-just requesting a snack. Bleeding controlled-peanut butter crackers provided. 8636: Pastoral care to unit and updated that patient would be moved to 308 upon completion of breakfast.     0745: Patient's breakfast tray taken to her at this time. Told patient to press her call bell when she is finished so we can get her to the bathroom and freshened up. Patient verbalized understanding. 0815: Patient wheeled to Postpartum unit at this time. Report given to Aannda patel RN.

## 2022-01-01 ENCOUNTER — HOSPITAL ENCOUNTER (EMERGENCY)
Age: 32
Discharge: HOME | End: 2022-01-01
Payer: COMMERCIAL

## 2022-01-01 VITALS
OXYGEN SATURATION: 99 % | SYSTOLIC BLOOD PRESSURE: 125 MMHG | TEMPERATURE: 101.9 F | WEIGHT: 245 LBS | BODY MASS INDEX: 38.45 KG/M2 | HEART RATE: 115 BPM | RESPIRATION RATE: 16 BRPM | DIASTOLIC BLOOD PRESSURE: 69 MMHG | HEIGHT: 67 IN

## 2022-01-01 DIAGNOSIS — R50.9 FEVER, UNSPECIFIED FEVER CAUSE: ICD-10-CM

## 2022-01-01 DIAGNOSIS — Z20.822 EXPOSURE TO CONFIRMED CASE OF COVID-19: Primary | ICD-10-CM

## 2022-01-01 DIAGNOSIS — B34.9 VIRAL ILLNESS: ICD-10-CM

## 2022-01-01 LAB
FLUAV AG NPH QL IA: NEGATIVE
FLUBV AG NOSE QL IA: NEGATIVE
SARS-COV-2, COV2: NORMAL

## 2022-01-01 PROCEDURE — 74011250637 HC RX REV CODE- 250/637: Performed by: EMERGENCY MEDICINE

## 2022-01-01 PROCEDURE — U0005 INFEC AGEN DETEC AMPLI PROBE: HCPCS

## 2022-01-01 PROCEDURE — 87804 INFLUENZA ASSAY W/OPTIC: CPT

## 2022-01-01 PROCEDURE — 99283 EMERGENCY DEPT VISIT LOW MDM: CPT

## 2022-01-01 RX ORDER — ACETAMINOPHEN 500 MG
1000 TABLET ORAL ONCE
Status: DISCONTINUED | OUTPATIENT
Start: 2022-01-01 | End: 2022-01-01 | Stop reason: HOSPADM

## 2022-01-01 RX ORDER — ACETAMINOPHEN 325 MG/1
650 TABLET ORAL
Status: COMPLETED | OUTPATIENT
Start: 2022-01-01 | End: 2022-01-01

## 2022-01-01 RX ADMIN — ACETAMINOPHEN 650 MG: 325 TABLET ORAL at 10:05

## 2022-01-01 NOTE — Clinical Note
[address] [facility] EMERGENCY DEPARTMENT  [address] [address]  [phone]    Work/School Note    Date: 1/1/2022     To Whom It May concern:    Rachael Murillo was evaluated by the following provider(s):  No providers found. COVID19 virus is suspected. Per the CDC guidelines we recommend home isolation until the following conditions are all met:    1. At least five days have passed since symptoms first appeared and/or had a close exposure,   2. After home isolation for five days, wearing a mask around others for the next five days,  3. At least 24 have passed since last fever without the use of fever-reducing medications and  4.  Symptoms (eg cough, shortness of breath) have improved      Sincerely,          Joby Byrd MD

## 2022-01-01 NOTE — Clinical Note
[address] [facility] EMERGENCY DEPARTMENT  [address] [facility] [id number] [phone]    Work/School Note    Date: 1/1/2022    To Whom It May concern:    Carlos Olguin was seen and treated today in the emergency room by the following provider(s):  No providers found. Carlos Olguin is excused from work/school on 1/1/2022 through 1/3/2022. She is medically clear to return to work/school on 1/4/2022.          Sincerely,          Kamala Orozco MD

## 2022-01-01 NOTE — ED PROVIDER NOTES
EMERGENCY DEPARTMENT HISTORY AND PHYSICAL EXAM      Date: (Not on file)  Patient Name: Francesca Murillo    History of Presenting Illness     Chief Complaint   Patient presents with    Fever       History Provided By: Patient    HPI: Francesca Murillo, 28 y.o. female with a past medical history significant No significant past medical history presents to the ED with cc of covid exposure. Pt's boyfriend tested positive. Three days of symptoms including cough, body aches and fever. Had miscarriage 12/23    There are no other complaints, changes, or physical findings at this time. PCP: None    No current facility-administered medications on file prior to encounter. Current Outpatient Medications on File Prior to Encounter   Medication Sig Dispense Refill    ibuprofen (MOTRIN) 800 mg tablet Take 1 Tablet by mouth every eight (8) hours as needed for Pain. Indications: a type of joint disorder due to excess uric acid in the blood called gout 40 Tablet 0    calcium-cholecalciferol, d3, (CALCIUM 600 + D) 600-125 mg-unit tab Take  by mouth. (Patient not taking: Reported on 10/12/2021)         Past History     Past Medical History:  No past medical history on file. Past Surgical History:  No past surgical history on file. Family History:  No family history on file. Social History:  Social History     Tobacco Use    Smoking status: Never Smoker    Smokeless tobacco: Never Used   Vaping Use    Vaping Use: Never used   Substance Use Topics    Alcohol use: Never    Drug use: Never       Allergies:  No Known Allergies      Review of Systems     Review of Systems   Constitutional: Positive for activity change, fatigue and fever. HENT: Positive for congestion. Respiratory: Positive for cough. Gastrointestinal: Negative. Genitourinary: Negative. Musculoskeletal: Positive for myalgias. Neurological: Negative. All other systems reviewed and are negative.       Physical Exam     Physical Exam  Vitals and nursing note reviewed. Constitutional:       Appearance: She is ill-appearing. Comments: Febrile   HENT:      Nose: Congestion present. Mouth/Throat:      Pharynx: No oropharyngeal exudate or posterior oropharyngeal erythema. Eyes:      Extraocular Movements: Extraocular movements intact. Conjunctiva/sclera: Conjunctivae normal.   Cardiovascular:      Rate and Rhythm: Tachycardia present. Pulmonary:      Effort: Pulmonary effort is normal.      Breath sounds: No wheezing. Abdominal:      Tenderness: There is abdominal tenderness. Musculoskeletal:         General: Normal range of motion. Cervical back: Normal range of motion. Lymphadenopathy:      Cervical: No cervical adenopathy. Skin:     General: Skin is warm. Neurological:      General: No focal deficit present. Lab and Diagnostic Study Results     Labs -     Recent Results (from the past 12 hour(s))   SARS-COV-2    Collection Time: 01/01/22 10:15 AM   Result Value Ref Range    SARS-CoV-2 Please find results under separate order     INFLUENZA A & B AG (RAPID TEST)    Collection Time: 01/01/22 10:15 AM   Result Value Ref Range    Influenza A Antigen Negative Negative      Influenza B Antigen Negative Negative         Radiologic Studies -   @lastxrresult@  CT Results  (Last 48 hours)    None        CXR Results  (Last 48 hours)    None            Medical Decision Making   - I am the first provider for this patient. - I reviewed the vital signs, available nursing notes, past medical history, past surgical history, family history and social history. - Initial assessment performed. The patients presenting problems have been discussed, and they are in agreement with the care plan formulated and outlined with them. I have encouraged them to ask questions as they arise throughout their visit. Vital Signs-Reviewed the patient's vital signs.   Patient Vitals for the past 12 hrs:   Temp Pulse Resp BP SpO2 01/01/22 0958 (!) 101.9 °F (38.8 °C) (!) 115 16 125/69 99 %       Records Reviewed: Nursing Notes    The patient presents with fever with a differential diagnosis of viral illness, most likely COVID in setting of recent close exposure. Will also check flu. Will treat fever. ED Course:            Procedures   Medical Decision Makingedical Decision Making  Performed by: Teri Escobedo MD  PROCEDURES:  Procedures       Disposition   Disposition: Condition improved    Discharged    DISCHARGE PLAN:  1. Current Discharge Medication List      CONTINUE these medications which have NOT CHANGED    Details   ibuprofen (MOTRIN) 800 mg tablet Take 1 Tablet by mouth every eight (8) hours as needed for Pain. Indications: a type of joint disorder due to excess uric acid in the blood called gout  Qty: 40 Tablet, Refills: 0    Associated Diagnoses: Inevitable complete miscarriage without complication      calcium-cholecalciferol, d3, (CALCIUM 600 + D) 600-125 mg-unit tab Take  by mouth. 2.   Follow-up Information     Follow up With Specialties Details Why Contact Info    [address] [place] [address] Emergency Medicine  As needed, If symptoms worsen [address] [place] [id number] [phone]        3. Return to ED if worse   4. Current Discharge Medication List            Diagnosis     Clinical Impression:   1. Exposure to confirmed case of COVID-19    2. Viral illness    3. Fever, unspecified fever cause        Attestations:    Teri Escobedo MD    Please note that this dictation was completed with [employer], the computer voice recognition software. Quite often unanticipated grammatical, syntax, homophones, and other interpretive errors are inadvertently transcribed by the computer software. Please disregard these errors. Please excuse any errors that have escaped final proofreading. Thank you.

## 2022-01-03 LAB
SARS-COV-2, XPLCVT: ABNORMAL
SOURCE, COVRS: ABNORMAL

## 2022-01-06 ENCOUNTER — OFFICE VISIT (OUTPATIENT)
Dept: OBGYN CLINIC | Age: 32
End: 2022-01-06
Payer: COMMERCIAL

## 2022-01-06 VITALS
WEIGHT: 243 LBS | RESPIRATION RATE: 16 BRPM | HEART RATE: 87 BPM | HEIGHT: 67 IN | SYSTOLIC BLOOD PRESSURE: 130 MMHG | DIASTOLIC BLOOD PRESSURE: 70 MMHG | BODY MASS INDEX: 38.14 KG/M2 | OXYGEN SATURATION: 97 %

## 2022-01-06 DIAGNOSIS — N96 RECURRENT PREGNANCY LOSS: ICD-10-CM

## 2022-01-06 PROCEDURE — 0503F POSTPARTUM CARE VISIT: CPT | Performed by: OBSTETRICS & GYNECOLOGY

## 2022-01-06 NOTE — LETTER
NOTIFICATION RETURN TO WORK / SCHOOL    1/6/2022     Ms. Lazo  [address] [place] [address],[address] [address]      To Whom It May Concern:    Violet is currently under the care of [address] [place] [address]. She will return to work/school on: 01/10/2022    If there are questions or concerns please have the patient contact our office.         Sincerely,      Caprice Ferguson MD

## 2022-01-17 DIAGNOSIS — N94.89 SUPPRESSION OF MENSES: Primary | ICD-10-CM

## 2022-01-17 RX ORDER — NORGESTIMATE AND ETHINYL ESTRADIOL 0.25-0.035
1 KIT ORAL DAILY
Qty: 28 DOSE PACK | Refills: 10 | Status: SHIPPED | OUTPATIENT
Start: 2022-01-17

## 2022-01-24 NOTE — PROGRESS NOTES
Navjot Iglesias is a 28 y.o. female, , Patient's last menstrual period was 2021., who presents today for the following:  Chief Complaint   Patient presents with   [address]        No Known Allergies    Current Outpatient Medications   Medication Sig    norgestimate-ethinyl estradioL (ORTHO-CYCLEN, SPRINTEC) 0.25-35 mg-mcg tab Take 1 Tablet by mouth daily.  ibuprofen (MOTRIN) 800 mg tablet Take 1 Tablet by mouth every eight (8) hours as needed for Pain. Indications: a type of joint disorder due to excess uric acid in the blood called gout (Patient not taking: Reported on 2022)    calcium-cholecalciferol, d3, (CALCIUM 600 + D) 600-125 mg-unit tab Take  by mouth. (Patient not taking: Reported on 10/12/2021)     No current facility-administered medications for this visit. History reviewed. No pertinent past medical history. History reviewed. No pertinent surgical history. History reviewed. No pertinent family history.     Social History     Socioeconomic History    Marital status: SINGLE     Spouse name: Not on file    Number of children: Not on file    Years of education: Not on file    Highest education level: 12th grade   Occupational History    Not on file   Tobacco Use    Smoking status: Never Smoker    Smokeless tobacco: Never Used   Vaping Use    Vaping Use: Never used   Substance and Sexual Activity    Alcohol use: Never    Drug use: Never    Sexual activity: Yes     Partners: Male     Birth control/protection: None   Other Topics Concern     Service Not Asked    Blood Transfusions Not Asked    Caffeine Concern Not Asked    Occupational Exposure Not Asked    Hobby Hazards Not Asked    Sleep Concern Not Asked    Stress Concern Not Asked    Weight Concern Not Asked    Special Diet Not Asked    Back Care Not Asked    Exercise Not Asked    Bike Helmet Not Asked    Seat Belt Not Asked    Self-Exams Not Asked   Social History Narrative    Not on file     Social Determinants of Health     Financial Resource Strain:     Difficulty of Paying Living Expenses: Not on file   Food Insecurity:     Worried About Running Out of Food in the Last Year: Not on file    [place] of Food in the Last Year: Not on file   Transportation Needs:     Lack of Transportation (Medical): Not on file    Lack of Transportation (Non-Medical): Not on file   Physical Activity:     Days of Exercise per Week: Not on file    Minutes of Exercise per Session: Not on file   Stress:     Feeling of Stress : Not on file   Social Connections:     Frequency of Communication with Friends and Family: Not on file    Frequency of Social Gatherings with Friends and Family: Not on file    Attends [employer] Services: Not on file    Active Member of [address] [place] [address] [employer] or Organizations: Not on file    Attends Club or Organization Meetings: Not on file    Marital Status: Not on file   Intimate Partner Violence:     Fear of Current or Ex-Partner: Not on file    Emotionally Abused: Not on file    Physically Abused: Not on file    Sexually Abused: Not on file   Housing Stability:     Unable to Pay for Housing in the Last Year: Not on file    Number of [place] in the Last Year: Not on file    Unstable Housing in the Last Year: Not on file         HPI   Patient presents for postpartum visit  S/p previable PROM and delivery of nonviable fetus  Doing well  Denies pain   NO abnormal bleeding  Reports moments of sadness as this 4 recurrent loss      Review of Systems   Constitutional: Negative. Respiratory: Negative. Cardiovascular: Negative. Gastrointestinal: Negative. Genitourinary: Negative. Musculoskeletal: Negative. Skin: Negative. Neurological: Negative. Endo/Heme/Allergies: Negative. Psychiatric/Behavioral: Negative. All other systems reviewed and are negative.          /70 (BP 1 Location: Right arm, BP Patient Position: Sitting)   Pulse 87   Resp 16 Ht 5' 7\" (1.702 m)   Wt 243 lb (110.2 kg)   LMP 08/08/2021   SpO2 97%   BMI 38.06 kg/m²    OBGyn Exam   PE:  Constitutional: General Appearance: healthy-appearing, well-nourished, well-developed, and well groomed. Psychiatric: Orientation: to time, place, and person. Mood and Affect: normal mood and affect and appropriate and active and alert. Abdomen: Auscultation/Inspection/Palpation: tenderness and mass and non-distended. Hernia: none palpated. Female Genitalia: Vulva: no masses, atrophy, or lesions. Bladder/Urethra: no urethral discharge or mass and normal meatus and bladder non distended. Vagina no tenderness, erythema, cystocele, rectocele, abnormal vaginal discharge, or vesicle(s) or ulcers. Cervix: no discharge or cervical motion tenderness and grossly normal.     Uterus: mobile, non-tender, and no uterine prolapse. Adnexa/Parametria: no adnexal tenderness. 1. Postpartum care and examination      2.  Recurrent pregnancy loss    - REFERRAL TO GYNECOLOGY  - US PELV NON OBS; Future

## 2022-03-19 PROBLEM — N88.3 CERVICAL INCOMPETENCE: Status: ACTIVE | Noted: 2021-12-22

## 2022-07-14 ENCOUNTER — HOSPITAL ENCOUNTER (EMERGENCY)
Age: 32
Discharge: HOME | End: 2022-07-14
Attending: EMERGENCY MEDICINE
Payer: COMMERCIAL

## 2022-07-14 VITALS
RESPIRATION RATE: 16 BRPM | DIASTOLIC BLOOD PRESSURE: 92 MMHG | SYSTOLIC BLOOD PRESSURE: 135 MMHG | HEIGHT: 67 IN | HEART RATE: 92 BPM | BODY MASS INDEX: 37.67 KG/M2 | OXYGEN SATURATION: 99 % | TEMPERATURE: 98.3 F | WEIGHT: 240 LBS

## 2022-07-14 DIAGNOSIS — U07.1 COVID-19 VIRUS INFECTION: Primary | ICD-10-CM

## 2022-07-14 PROCEDURE — 99283 EMERGENCY DEPT VISIT LOW MDM: CPT

## 2022-07-14 RX ORDER — PSEUDOEPHEDRINE HCL 120 MG/1
120 TABLET, FILM COATED, EXTENDED RELEASE ORAL
Qty: 20 TABLET | Refills: 0 | Status: SHIPPED | OUTPATIENT
Start: 2022-07-14

## 2022-07-14 NOTE — ED PROVIDER NOTES
EMERGENCY DEPARTMENT HISTORY AND PHYSICAL EXAM      Date: 7/14/2022  Patient Name: Adriano Schroeder  Patient Age and Sex: 28 y.o. female     History of Presenting Illness     Chief Complaint   Patient presents with    Abnormal Lab Results       History Provided By: Patient    HPI: Adriano Schroeder is a 81-year-old female presenting for COVID-19 infection and work note. Patient states that she started having nasal congestion as well as sore throat yesterday and did a COVID test at home and it was positive. Was told by her work that she needed a work note to say when she could return back to work. Otherwise no shortness of breath or fevers. There are no other complaints, changes, or physical findings at this time. PCP: Blaez Arredondo    No current facility-administered medications on file prior to encounter. Current Outpatient Medications on File Prior to Encounter   Medication Sig Dispense Refill    norgestimate-ethinyl estradioL (ORTHO-CYCLEN, [facility]) 0.25-35 mg-mcg tab Take 1 Tablet by mouth daily. 28 Dose Pack 10    ibuprofen (MOTRIN) 800 mg tablet Take 1 Tablet by mouth every eight (8) hours as needed for Pain. Indications: a type of joint disorder due to excess uric acid in the blood called gout (Patient not taking: Reported on 1/6/2022) 40 Tablet 0    calcium-cholecalciferol, d3, (CALCIUM 600 + D) 600-125 mg-unit tab Take  by mouth. (Patient not taking: Reported on 10/12/2021)         Past History     Past Medical History:  No past medical history on file. Past Surgical History:  No past surgical history on file. Family History:  No family history on file.     Social History:  Social History     Tobacco Use    Smoking status: Never Smoker    Smokeless tobacco: Never Used   Vaping Use    Vaping Use: Never used   Substance Use Topics    Alcohol use: Never    Drug use: Never       Allergies:  No Known Allergies      Review of Systems   Review of Systems   Constitutional: Negative for chills and fever. HENT: Positive for congestion and sore throat. Respiratory: Negative for cough and shortness of breath. Cardiovascular: Negative for chest pain. Gastrointestinal: Negative for abdominal pain, constipation, diarrhea, nausea and vomiting. Genitourinary: Negative for dysuria, frequency and hematuria. Neurological: Negative for weakness and numbness. All other systems reviewed and are negative. Physical Exam   Physical Exam  Vitals and nursing note reviewed. Constitutional:       Appearance: She is well-developed. HENT:      Head: Normocephalic and atraumatic. Nose: Nose normal.      Mouth/Throat:      Mouth: Mucous membranes are moist.   Eyes:      Extraocular Movements: Extraocular movements intact. Conjunctiva/sclera: Conjunctivae normal.   Cardiovascular:      Rate and Rhythm: Normal rate and regular rhythm. Pulmonary:      Effort: Pulmonary effort is normal. No respiratory distress. Breath sounds: Normal breath sounds. Abdominal:      General: There is no distension. Palpations: Abdomen is soft. Tenderness: There is no abdominal tenderness. Musculoskeletal:         General: Normal range of motion. Cervical back: Normal range of motion and neck supple. Skin:     General: Skin is warm and dry. Neurological:      General: No focal deficit present. Mental Status: She is alert and oriented to person, place, and time. Mental status is at baseline. Psychiatric:         Mood and Affect: Mood normal.          Diagnostic Study Results     Labs -   No results found for this or any previous visit (from the past 12 hour(s)). Radiologic Studies -   No orders to display     CT Results  (Last 48 hours)    None        CXR Results  (Last 48 hours)    None            Medical Decision Making   I am the first provider for this patient.     I reviewed the vital signs, available nursing notes, past medical history, past surgical history, family history and social history. Vital Signs-Reviewed the patient's vital signs. Patient Vitals for the past 12 hrs:   Temp Pulse Resp BP SpO2   07/14/22 1452 -- -- -- (!) 135/92 --   07/14/22 1451 98.3 °F (36.8 °C) 92 16 -- 99 %       Records Reviewed: Nursing Notes and Old Medical Records    Provider Notes (Medical Decision Making):   Patient presented with mild URI symptoms and noted to have COVID-19. Really here for work note to explain when she could go back to work. ED Course:   Initial assessment performed. The patients presenting problems have been discussed, and they are in agreement with the care plan formulated and outlined with them. I have encouraged them to ask questions as they arise throughout their visit. Critical Care Time:   0    Disposition:  Discharge Note:  The patient has been re-evaluated and is ready for discharge. Reviewed available results with patient. Counseled patient on diagnosis and care plan. Patient has expressed understanding, and all questions have been answered. Patient agrees with plan and agrees to follow up as recommended, or to return to the ED if their symptoms worsen. Discharge instructions have been provided and explained to the patient, along with reasons to return to the ED. PLAN:  Discharge Medication List as of 7/14/2022  3:01 PM      START taking these medications    Details   pseudoephedrine CR (Sudafed 12 Hour) 120 mg CR tablet Take 1 Tablet by mouth two (2) times daily as needed for Congestion. , Normal, Disp-20 Tablet, R-0         CONTINUE these medications which have NOT CHANGED    Details   norgestimate-ethinyl estradioL (ORTHO-CYCLEN, SPRINTEC) 0.25-35 mg-mcg tab Take 1 Tablet by mouth daily. , Normal, Disp-28 Dose Pack, R-10      ibuprofen (MOTRIN) 800 mg tablet Take 1 Tablet by mouth every eight (8) hours as needed for Pain.  Indications: a type of joint disorder due to excess uric acid in the blood called gout, Normal, Disp-40 Tablet, R-0 calcium-cholecalciferol, d3, (CALCIUM 600 + D) 600-125 mg-unit tab Take  by mouth., Historical Med         1.   2.   Follow-up Information    None       3. Return to ED if worse     Diagnosis     Clinical Impression:   1. COVID-19 virus infection        Attestations:    Emanuel Watt M.D. Please note that this dictation was completed with [employer], the computer voice recognition software. Quite often unanticipated grammatical, syntax, homophones, and other interpretive errors are inadvertently transcribed by the computer software. Please disregard these errors. Please excuse any errors that have escaped final proofreading. Thank you.

## 2022-07-14 NOTE — Clinical Note
[facility] EMERGENCY DEPT  [address] [address]  [phone]    Work/School Note    Date: 7/14/2022     To Whom It May concern:    Zeke Ames was evaluated by the following provider(s):  Attending Provider: Pamela Angulo MD.   Precilla Hurst virus is suspected. Per the CDC guidelines we recommend home isolation until the following conditions are all met:    1. At least five days have passed since symptoms first appeared and/or had a close exposure,   2. After home isolation for five days, wearing a mask around others for the next five days,  3. At least 24 have passed since last fever without the use of fever-reducing medications and  4. Symptoms (eg cough, shortness of breath) have improved    Please excuse her from work for the next 5 days as she is quarantining. She will need to quarantine for 5 days and rapid test negative before returning to work.   Sincerely,          Amaris Bravo

## 2022-07-14 NOTE — Clinical Note
[facility] EMERGENCY DEPT  [address] [address] [phone]    Work/School Note    Date: 7/14/2022     To Whom It May concern:    Juan Pisano was evaluated by the following provider(s):  Attending Provider: Selam Jasmine MD.   [address] virus is suspected. Per the CDC guidelines we recommend home isolation until the following conditions are all met:    1. At least five days have passed since symptoms first appeared and/or had a close exposure,   2. After home isolation for five days, wearing a mask around others for the next five days,  3. At least 24 have passed since last fever without the use of fever-reducing medications and  4. Symptoms (eg cough, shortness of breath) have improved    Please excuse her from work for the next 5 days as she is quarantining. She will need to quarantine for 5 days and rapid test negative before returning to work.   Sincerely,          Candida Treviño MD

## 2022-07-15 ENCOUNTER — PATIENT OUTREACH (OUTPATIENT)
Dept: CASE MANAGEMENT | Age: 32
End: 2022-07-15

## 2022-07-15 ENCOUNTER — HOSPITAL ENCOUNTER (EMERGENCY)
Age: 32
Discharge: HOME | End: 2022-07-15
Attending: EMERGENCY MEDICINE
Payer: COMMERCIAL

## 2022-07-15 VITALS
BODY MASS INDEX: 37.67 KG/M2 | TEMPERATURE: 98.2 F | HEIGHT: 67 IN | WEIGHT: 240 LBS | OXYGEN SATURATION: 97 % | RESPIRATION RATE: 17 BRPM | HEART RATE: 85 BPM | SYSTOLIC BLOOD PRESSURE: 128 MMHG | DIASTOLIC BLOOD PRESSURE: 88 MMHG

## 2022-07-15 DIAGNOSIS — U07.1 COVID-19: Primary | ICD-10-CM

## 2022-07-15 LAB
DEPRECATED S PYO AG THROAT QL EIA: NEGATIVE
FLUAV AG NPH QL IA: NEGATIVE
FLUBV AG NOSE QL IA: NEGATIVE
SARS-COV-2, COV2: NORMAL

## 2022-07-15 PROCEDURE — U0005 INFEC AGEN DETEC AMPLI PROBE: HCPCS

## 2022-07-15 PROCEDURE — 99283 EMERGENCY DEPT VISIT LOW MDM: CPT

## 2022-07-15 PROCEDURE — 87804 INFLUENZA ASSAY W/OPTIC: CPT

## 2022-07-15 PROCEDURE — 87880 STREP A ASSAY W/OPTIC: CPT

## 2022-07-15 NOTE — ED NOTES
Provider at bedside for dispo and follow up. Discharge plan reviewed and paperwork signed, teaching completed including treatment received, medications and follow up plan of care, pain level within manageable comfortable limits,  ambulatory to exit, gait steady, safety maintained.

## 2022-07-15 NOTE — ED TRIAGE NOTES
Patient states she has a sore throat, fever and cough that started Wednesday, patient just took motrin before she came

## 2022-07-15 NOTE — ED PROVIDER NOTES
EMERGENCY DEPARTMENT HISTORY AND PHYSICAL EXAM      Date: 7/15/2022  Patient Name: Cristian Tom    History of Presenting Illness     Chief Complaint   Patient presents with    Sore Throat    Cough    Fever       History Provided By: Patient    HPI: Cristian Tom, 28 y.o. female presents to the ED with CC of flu-like symptoms. Patient reports 2 to 3-day history of sore throat, cough, congestion, and subjective fever. States that she has been treating her symptoms with Motrin with minimal relief. Patient denies being vaccinated against COVID. Denies any known sick contacts. Patient states that she is otherwise well and has no further concerns. Patient denies SOB, chest pain, or any neurological symptoms. There are no other complaints, changes, or physical findings at this time. Past History     Past Medical History:  No past medical history on file. Allergies:  No Known Allergies    Review of Systems   Vital signs and nursing notes reviewed  Review of Systems   Constitutional: Positive for fever. Negative for chills and diaphoresis. HENT: Positive for congestion and sore throat. Negative for rhinorrhea. Eyes: Negative. Respiratory: Positive for cough. Negative for shortness of breath. Cardiovascular: Negative. Negative for chest pain. Gastrointestinal: Negative. Negative for abdominal pain, diarrhea, nausea and vomiting. Genitourinary: Negative. Negative for difficulty urinating, dysuria and frequency. Musculoskeletal: Negative. Skin: Negative. Negative for rash. Neurological: Negative. Negative for dizziness, weakness, numbness and headaches. Psychiatric/Behavioral: Negative. All other systems reviewed and are negative.         Physical Exam     Visit Vitals  /88 (BP 1 Location: Right upper arm, BP Patient Position: At rest)   Pulse 85   Temp 98.2 °F (36.8 °C)   Resp 17   Ht 5' 7\" (1.702 m)   Wt 108.9 kg (240 lb)   SpO2 97%   BMI 37.59 kg/m² CONSTITUTIONAL: Alert, in no distress. Appears stated age. HEAD:  Normocephalic, atraumatic  ENT:  Erythematous posterior oropharynx, no exudates or swelling  EYES: EOM intact. No conjunctival injection or scleral icterus  Neck:  Supple. No meningismus  RESP: Normal with no work of breathing, speaking in full sentences. CV: Well perfused. NEURO: Alert with normal mentation, moving extremities spontaneously  PSYCH: Normal mood, normal affect      Medical Decision Making   Patient presents for COVID 19 testing with normal oxygen saturation and mild URI symptoms or COVID 19 exposure. COVID 19 testing was conducted. The patient was given quarantine/isolation recommendations and agrees with the plan to be discharged home. They were provided instructions to return for difficulty breathing, chest pain, altered mentation, or any other new or worsening symptoms. ED Course:   Initial assessment performed. The patients presenting problems have been discussed, and they are in agreement with the care plan formulated and outlined with them. I have encouraged them to ask questions as they arise throughout their visit. Critical Care Time: None    Disposition:  DISCHARGE NOTE:  The pt is ready for discharge. The pt's signs, symptoms, diagnosis, and discharge instructions have been discussed and pt has conveyed their understanding. The pt is to follow up as recommended or return to ER should their symptoms worsen. Plan has been discussed and pt is in agreement. PLAN:  1. Discharge Medication List as of 7/15/2022  2:57 PM        2. Follow-up Information     Follow up With Specialties Details Why Contact Preeti Darden Nurse Practitioner Schedule an appointment as soon as possible for a visit  As needed [address] [facility] [address]  [phone]          3. COVID Testing results will be called once available if positive. Patient should utilize [employer] to access results.    4. Take Tylenol or Ibuprofen as needed  5. Drink plenty of fluids  6. Return to ED if worse especially if any shortness of breath, chest pain or altered mentation. Diagnosis     Clinical Impression:   1. COVID-19        Please note that this dictation was completed with Revizer, the computer voice recognition software. Quite often unanticipated grammatical, syntax, homophones, and other interpretive errors are inadvertently transcribed by the computer software. Please disregards these errors. Please excuse any errors that have escaped final proofreading.

## 2022-07-15 NOTE — PROGRESS NOTES
Patient contacted regarding COVID-19 diagnosis. Discussed COVID-19 related testing which was not done at this time. Test results were not done. Patient informed of results, if available? yes. LPN Care Coordinator contacted the patient by telephone to perform post discharge assessment. Call within 2 business days of discharge: Yes Verified name and  with patient as identifiers. Provided introduction to self, and explanation of the CTN/ACM role, and reason for call due to risk factors for infection and/or exposure to COVID-19. Symptoms reviewed with patient who verbalized the following symptoms: no new symptoms and no worsening symptoms      Due to no new or worsening symptoms encounter was not routed to provider for escalation. Discussed follow-up appointments. If no appointment was previously scheduled, appointment scheduling offered:  no. [facility] follow up appointment(s): No future appointments. Non-[facility] follow up appointment(s): NA    Interventions to address risk factors: Obtained and reviewed discharge summary and/or continuity of care documents  Reviewed discharge instructions, medical action plan and red flags with patient who verbalized understanding. Advance Care Planning:   Does patient have an Advance Directive: decision makers updated. Educated patient about risk for severe COVID-19 due to risk factors according to CDC guidelines. LPN CC reviewed discharge instructions, medical action plan and red flag symptoms with the patient who verbalized understanding. Discussed COVID vaccination status: no. Education provided on COVID-19 vaccination as appropriate. Discussed exposure protocols and quarantine with CDC Guidelines. Patient was given an opportunity to verbalize any questions and concerns and agrees to contact LPN CC or health care provider for questions related to their healthcare.     Reviewed and educated patient on any new and changed medications related to discharge diagnosis Was patient discharged with a pulse oximeter? no    LPN CC provided contact information. No further follow-up call identified based on severity of symptoms and risk factors.

## 2022-07-16 LAB
SARS-COV-2, XPLCVT: DETECTED
SOURCE, COVRS: ABNORMAL

## 2022-11-25 ENCOUNTER — HOSPITAL ENCOUNTER (EMERGENCY)
Age: 32
Discharge: HOME | End: 2022-11-25
Attending: STUDENT IN AN ORGANIZED HEALTH CARE EDUCATION/TRAINING PROGRAM
Payer: COMMERCIAL

## 2022-11-25 VITALS
HEART RATE: 73 BPM | WEIGHT: 220 LBS | BODY MASS INDEX: 34.53 KG/M2 | OXYGEN SATURATION: 100 % | SYSTOLIC BLOOD PRESSURE: 151 MMHG | TEMPERATURE: 98.6 F | HEIGHT: 67 IN | DIASTOLIC BLOOD PRESSURE: 106 MMHG | RESPIRATION RATE: 18 BRPM

## 2022-11-25 DIAGNOSIS — J02.9 SORE THROAT: Primary | ICD-10-CM

## 2022-11-25 DIAGNOSIS — B34.9 VIRAL SYNDROME: ICD-10-CM

## 2022-11-25 LAB
DEPRECATED S PYO AG THROAT QL EIA: NEGATIVE
FLUAV AG NPH QL IA: NEGATIVE
FLUBV AG NOSE QL IA: NEGATIVE

## 2022-11-25 PROCEDURE — 87070 CULTURE OTHR SPECIMN AEROBIC: CPT

## 2022-11-25 PROCEDURE — 87804 INFLUENZA ASSAY W/OPTIC: CPT

## 2022-11-25 PROCEDURE — 99283 EMERGENCY DEPT VISIT LOW MDM: CPT

## 2022-11-25 PROCEDURE — 74011250637 HC RX REV CODE- 250/637: Performed by: STUDENT IN AN ORGANIZED HEALTH CARE EDUCATION/TRAINING PROGRAM

## 2022-11-25 PROCEDURE — 87880 STREP A ASSAY W/OPTIC: CPT

## 2022-11-25 RX ORDER — ACETAMINOPHEN 325 MG/1
650 TABLET ORAL ONCE
Status: COMPLETED | OUTPATIENT
Start: 2022-11-25 | End: 2022-11-25

## 2022-11-25 RX ADMIN — ACETAMINOPHEN 650 MG: 325 TABLET ORAL at 05:39

## 2022-11-25 NOTE — ED PROVIDER NOTES
EMERGENCY DEPARTMENT HISTORY AND PHYSICAL EXAM      Date: 11/25/2022  Patient Name: Nael Carmona    History of Presenting Illness     Chief Complaint   Patient presents with    Sore Throat       History Provided By: Patient    HPI: Nael Carmona, 28 y.o. female with a past medical history significant No significant past medical history presents to the ED with cc of sore throat, chills. Patient is approximately 5 weeks pregnant by dates, states she has had a runny nose and sore throat over the last 2 days. Chills last evening.  states the patient \"felt very hot\" was concerned about an infection brought patient to emergency department. Patient denies any chest pain shortness of breath, no abdominal pain nausea vomiting. No pain or burning on urination. There are no other complaints, changes, or physical findings at this time. PCP: Osmani Bowless    No current facility-administered medications on file prior to encounter. Current Outpatient Medications on File Prior to Encounter   Medication Sig Dispense Refill    norgestimate-ethinyl estradioL (Debarah Clonts) 0.25-35 mg-mcg tab Take 1 Tablet by mouth daily. 28 Dose Pack 10    ibuprofen (MOTRIN) 800 mg tablet Take 1 Tablet by mouth every eight (8) hours as needed for Pain. Indications: a type of joint disorder due to excess uric acid in the blood called gout 40 Tablet 0    calcium-cholecalciferol, d3, (CALCIUM 600 + D) 600-125 mg-unit tab Take  by mouth.      pseudoephedrine CR (Sudafed 12 Hour) 120 mg CR tablet Take 1 Tablet by mouth two (2) times daily as needed for Congestion. 20 Tablet 0       Past History     Past Medical History:  History reviewed. No pertinent past medical history. Past Surgical History:  History reviewed. No pertinent surgical history. Family History:  History reviewed. No pertinent family history.     Social History:  Social History     Tobacco Use    Smoking status: Never    Smokeless tobacco: Never   Vaping Use    Vaping Use: Never used   Substance Use Topics    Alcohol use: Not Currently    Drug use: Not Currently       Allergies:  No Known Allergies      Review of Systems     Review of Systems   Constitutional:  Positive for chills. Negative for activity change, appetite change and fever. HENT:  Negative for ear pain, rhinorrhea and sore throat. Eyes:  Negative for photophobia and visual disturbance. Respiratory:  Positive for cough. Negative for shortness of breath. Cardiovascular:  Negative for chest pain and palpitations. Gastrointestinal:  Negative for abdominal pain and nausea. Genitourinary:  Negative for dysuria and hematuria. Musculoskeletal:  Negative for arthralgias and myalgias. Skin:  Negative for color change and pallor. Neurological:  Positive for headaches. Negative for dizziness, weakness and numbness. Physical Exam     Physical Exam  Vitals and nursing note reviewed. Constitutional:       General: She is not in acute distress. Appearance: Normal appearance. She is normal weight. She is not ill-appearing. HENT:      Head: Normocephalic and atraumatic. Nose: Nose normal.      Mouth/Throat:      Mouth: Mucous membranes are moist.   Eyes:      Extraocular Movements: Extraocular movements intact. Pupils: Pupils are equal, round, and reactive to light. Cardiovascular:      Rate and Rhythm: Normal rate and regular rhythm. Pulses: Normal pulses. Pulmonary:      Effort: Pulmonary effort is normal.      Breath sounds: Normal breath sounds. Abdominal:      General: Abdomen is flat. Bowel sounds are normal.      Palpations: Abdomen is soft. Tenderness: There is no abdominal tenderness. There is no guarding. Musculoskeletal:         General: No tenderness. Normal range of motion. Cervical back: Normal range of motion and neck supple. No muscular tenderness. Skin:     General: Skin is warm and dry.    Neurological:      General: No focal deficit present. Mental Status: She is alert and oriented to person, place, and time. Sensory: No sensory deficit. Motor: No weakness. Diagnostic Study Results     Labs -     Recent Results (from the past 12 hour(s))   INFLUENZA A & B AG (RAPID TEST)    Collection Time: 11/25/22  5:30 AM   Result Value Ref Range    Influenza A Antigen Negative Negative      Influenza B Antigen Negative Negative     STREP AG SCREEN, GROUP A    Collection Time: 11/25/22  5:30 AM    Specimen: Serum; Throat   Result Value Ref Range    Group A Strep Ag ID Negative         Radiologic Studies -   @lastxrresult@  CT Results  (Last 48 hours)      None          CXR Results  (Last 48 hours)      None              Medical Decision Making   I am the first provider for this patient. I reviewed the vital signs, available nursing notes, past medical history, past surgical history, family history and social history. Vital Signs-Reviewed the patient's vital signs. Patient Vitals for the past 12 hrs:   Temp Pulse Resp BP SpO2   11/25/22 0455 98.6 °F (37 °C) 73 18 (!) 151/106 100 %       Records Reviewed: Nursing Notes    The patient presents with sore throat chills with a differential diagnosis of viral URI, pharyngitis, strep throat, influenza      Provider Notes (Medical Decision Making):     MDM  30-year-old female 5 weeks pregnant presents emergency department for sore throat chills. The    Physical shows well-appearing female no distress afebrile moderate hypertension noted in triage. Rapid strep negative, influenza negative, patient received Tylenol in ED, most likely viral syndrome NOS, instructed patient to take Tylenol as needed for aches pains, follow-up with OB/GYN within the next week. ED Course:   Initial assessment performed. The patients presenting problems have been discussed, and they are in agreement with the care plan formulated and outlined with them.   I have encouraged them to ask questions as they arise throughout their visit. PROCEDURES  Procedures         PLAN:  1. Current Discharge Medication List        2. Follow-up Information       Follow up With Specialties Details Why Contact Info    Shanna Leon Nurse Practitioner In 1 week As needed [phone]            Return to ED if worse     Diagnosis     Clinical Impression:   1. Sore throat    2.  Viral syndrome

## 2022-11-26 LAB
BACTERIA SPEC CULT: NORMAL
SPECIAL REQUESTS,SREQ: NORMAL

## 2022-11-30 ENCOUNTER — TELEPHONE (OUTPATIENT)
Dept: OBGYN CLINIC | Age: 32
End: 2022-11-30

## 2022-12-05 ENCOUNTER — INITIAL PRENATAL (OUTPATIENT)
Dept: OBGYN CLINIC | Age: 32
End: 2022-12-05

## 2022-12-05 VITALS
HEIGHT: 67 IN | HEART RATE: 85 BPM | RESPIRATION RATE: 16 BRPM | BODY MASS INDEX: 38.92 KG/M2 | OXYGEN SATURATION: 98 % | DIASTOLIC BLOOD PRESSURE: 88 MMHG | WEIGHT: 248 LBS | SYSTOLIC BLOOD PRESSURE: 134 MMHG

## 2022-12-05 DIAGNOSIS — N88.3 INCOMPETENCE OF CERVIX: ICD-10-CM

## 2022-12-05 DIAGNOSIS — N91.2 AMENORRHEA: Primary | ICD-10-CM

## 2022-12-05 DIAGNOSIS — N76.0 VAGINITIS AND VULVOVAGINITIS: ICD-10-CM

## 2022-12-05 DIAGNOSIS — Z01.419 GYNECOLOGIC EXAM NORMAL: ICD-10-CM

## 2022-12-05 PROCEDURE — 99214 OFFICE O/P EST MOD 30 MIN: CPT | Performed by: OBSTETRICS & GYNECOLOGY

## 2022-12-05 PROCEDURE — 76801 OB US < 14 WKS SINGLE FETUS: CPT | Performed by: OBSTETRICS & GYNECOLOGY

## 2022-12-05 RX ORDER — PROGESTERONE 200 MG/1
CAPSULE ORAL
Qty: 60 CAPSULE | Refills: 2 | Status: SHIPPED | OUTPATIENT
Start: 2022-12-05

## 2022-12-05 RX ORDER — TERCONAZOLE 4 MG/G
1 CREAM VAGINAL
Qty: 45 G | Refills: 0 | Status: SHIPPED | OUTPATIENT
Start: 2022-12-05

## 2022-12-07 LAB
C TRACH RRNA CVX QL NAA+PROBE: NEGATIVE
CYTOLOGIST CVX/VAG CYTO: NORMAL
CYTOLOGY CVX/VAG DOC CYTO: NORMAL
CYTOLOGY CVX/VAG DOC THIN PREP: NORMAL
DX ICD CODE: NORMAL
HPV GENOTYPE REFLEX: NORMAL
HPV I/H RISK 4 DNA CVX QL PROBE+SIG AMP: NEGATIVE
Lab: NORMAL
N GONORRHOEA RRNA CVX QL NAA+PROBE: NEGATIVE
OTHER STN SPEC: NORMAL
STAT OF ADQ CVX/VAG CYTO-IMP: NORMAL
T VAGINALIS RRNA SPEC QL NAA+PROBE: NEGATIVE

## 2022-12-20 ENCOUNTER — DOCUMENTATION ONLY (OUTPATIENT)
Dept: OBGYN CLINIC | Age: 32
End: 2022-12-20

## 2022-12-28 NOTE — PROGRESS NOTES
Satinder Obregon is a 33 y.o. female, , Patient's last menstrual period was 2022., who presents today for the following:  Chief Complaint   Patient presents with    Initial Prenatal Visit        No Known Allergies    Current Outpatient Medications   Medication Sig    progesterone (PROMETRIUM) 200 mg capsule Insert one capsule intravaginally at bedtime    terconazole (TERAZOL 7) 0.4 % vaginal cream Insert 1 Applicator into vagina nightly.  pseudoephedrine CR (Sudafed 12 Hour) 120 mg CR tablet Take 1 Tablet by mouth two (2) times daily as needed for Congestion. (Patient not taking: Reported on 2022)    norgestimate-ethinyl estradioL (ORTHO-CYCLEN, SPRINTEC) 0.25-35 mg-mcg tab Take 1 Tablet by mouth daily. (Patient not taking: Reported on 2022)    ibuprofen (MOTRIN) 800 mg tablet Take 1 Tablet by mouth every eight (8) hours as needed for Pain. Indications: a type of joint disorder due to excess uric acid in the blood called gout    calcium-cholecalciferol, d3, (CALCIUM 600 + D) 600-125 mg-unit tab Take  by mouth. (Patient not taking: Reported on 2022)     No current facility-administered medications for this visit. History reviewed. No pertinent past medical history. History reviewed. No pertinent surgical history. History reviewed. No pertinent family history.     Social History     Socioeconomic History    Marital status: SINGLE     Spouse name: Not on file    Number of children: Not on file    Years of education: Not on file    Highest education level: 12th grade   Occupational History    Not on file   Tobacco Use    Smoking status: Never    Smokeless tobacco: Never   Vaping Use    Vaping Use: Never used   Substance and Sexual Activity    Alcohol use: Not Currently    Drug use: Not Currently    Sexual activity: Yes     Partners: Male     Birth control/protection: None   Other Topics Concern     Service Not Asked    Blood Transfusions Not Asked    Caffeine Concern Not Asked    Occupational Exposure Not Asked    Hobby Hazards Not Asked    Sleep Concern Not Asked    Stress Concern Not Asked    Weight Concern Not Asked    Special Diet Not Asked    Back Care Not Asked    Exercise Not Asked    Bike Helmet Not Asked   [address] Not Asked    Self-Exams Not Asked   Social History Narrative    Not on file     Social Determinants of Health     Financial Resource Strain: Not on file   Food Insecurity: Not on file   Transportation Needs: Not on file   Physical Activity: Not on file   Stress: Not on file   Social Connections: Not on file   Intimate Partner Violence: Not on file   Housing Stability: Not on file         Initial Prenatal Visit  Patient present for evaluation  Amenorrhea + pregnancy test  Doing well  No pain no bleeding  Hx of recurrent pregnancy loss  Incompetent cervix    Transvaginal Study for viability    SIUP at [facility]W3D by [facility] [facility] [address] 0.54cm with TUSHAR 7/28/23     FHT: 111 BPM   YS: 0.19 cm   CX: 3.6cm     Normal appearing adnexa, ovarian vascular flow demonstrated. Lt ovary obscured by bowel gas. No apparent subchorionic bleed. DWP    Review of Systems   Constitutional: Negative. Respiratory: Negative. Cardiovascular: Negative. Gastrointestinal: Negative. Genitourinary: Negative. Musculoskeletal: Negative. Skin: Negative. Neurological: Negative. Endo/Heme/Allergies: Negative. Psychiatric/Behavioral: Negative. All other systems reviewed and are negative. /88 (BP 1 Location: Right upper arm, BP Patient Position: Sitting)   Pulse 85   Resp 16   Ht 5' 7\" (1.702 m)   Wt 248 lb (112.5 kg)   LMP 11/20/2022   SpO2 98%   BMI 38.84 kg/m²    OBGyn Exam   PE:  Constitutional: General Appearance: healthy-appearing, well-nourished, well-developed, and well groomed. Psychiatric: Orientation: to time, place, and person. Mood and Affect: normal mood and affect and appropriate and active and alert.      Abdomen: Auscultation/Inspection/Palpation: no tenderness and mass and non-distended. Hernia: none palpated. Female Genitalia: Vulva: no masses, atrophy, or lesions. Bladder/Urethra: no urethral discharge or mass and normal meatus and bladder non distended. Vagina no tenderness, erythema, cystocele, rectocele, +abnormal vaginal discharge,. Cervix: no discharge or cervical motion tenderness and grossly normal.     Uterus: mobile, non-tender, and no uterine prolapse. Adnexa/Parametria: no adnexal tenderness. 1. Amenorrhea    - HEP B SURFACE AG  - T PALLIDUM SCREEN W/REFLEX  - RUBELLA AB, IGG  - TYPE & SCREEN  - HIV 1/2 AG/AB, 4TH GENERATION,W RFLX CONFIRM  - CBC WITH AUTOMATED DIFF  - HEMOGLOBIN FRACTIONATION  - THYROID CASCADE PROFILE  - CMV AB, IGG  - CMV AB, IGM  - VITAMIN D, 25 HYDROXY    2. Incompetence of cervix    - progesterone (PROMETRIUM) 200 mg capsule; Insert one capsule intravaginally at bedtime  Dispense: 60 Capsule; Refill: 2  - REFERRAL TO MATERNAL FETAL MEDICINE    3. Gynecologic exam normal    - PAP IG, CT-NG-TV, APTIMA HPV AND RFX 81/37,90([id number])    4. Vaginitis and vulvovaginitis    - terconazole (TERAZOL 7) 0.4 % vaginal cream; Insert 1 Applicator into vagina nightly. Dispense: 45 g; Refill: 0        Follow-up and Dispositions    Return for ob.

## 2022-12-31 ENCOUNTER — HOSPITAL ENCOUNTER (EMERGENCY)
Age: 32
Discharge: HOME | End: 2022-12-31
Attending: EMERGENCY MEDICINE
Payer: COMMERCIAL

## 2022-12-31 VITALS
DIASTOLIC BLOOD PRESSURE: 82 MMHG | SYSTOLIC BLOOD PRESSURE: 133 MMHG | BODY MASS INDEX: 37.67 KG/M2 | RESPIRATION RATE: 17 BRPM | HEIGHT: 67 IN | TEMPERATURE: 98.2 F | HEART RATE: 81 BPM | OXYGEN SATURATION: 100 % | WEIGHT: 240 LBS

## 2022-12-31 DIAGNOSIS — K59.00 CONSTIPATION, UNSPECIFIED CONSTIPATION TYPE: ICD-10-CM

## 2022-12-31 DIAGNOSIS — R10.2 PELVIC PAIN DURING PREGNANCY: Primary | ICD-10-CM

## 2022-12-31 DIAGNOSIS — O26.899 PELVIC PAIN DURING PREGNANCY: Primary | ICD-10-CM

## 2022-12-31 LAB
APPEARANCE UR: CLEAR
BACTERIA URNS QL MICRO: ABNORMAL /HPF
BILIRUB UR QL: NEGATIVE
COLOR UR: YELLOW
EPITH CASTS URNS QL MICRO: ABNORMAL /LPF
GLUCOSE UR STRIP.AUTO-MCNC: NEGATIVE MG/DL
HGB UR QL STRIP: NEGATIVE
KETONES UR QL STRIP.AUTO: NEGATIVE MG/DL
LEUKOCYTE ESTERASE UR QL STRIP.AUTO: NEGATIVE
NITRITE UR QL STRIP.AUTO: NEGATIVE
PH UR STRIP: 8 [PH] (ref 5–8)
PROT UR STRIP-MCNC: NEGATIVE MG/DL
RBC #/AREA URNS HPF: ABNORMAL /HPF (ref 0–5)
SP GR UR REFRACTOMETRY: 1.01 (ref 1–1.03)
UROBILINOGEN UR QL STRIP.AUTO: 0.1 EU/DL (ref 0.2–1)
WBC URNS QL MICRO: ABNORMAL /HPF (ref 0–4)

## 2022-12-31 PROCEDURE — 99283 EMERGENCY DEPT VISIT LOW MDM: CPT

## 2022-12-31 PROCEDURE — 74011250637 HC RX REV CODE- 250/637: Performed by: EMERGENCY MEDICINE

## 2022-12-31 PROCEDURE — 81003 URINALYSIS AUTO W/O SCOPE: CPT

## 2022-12-31 RX ORDER — DOCUSATE SODIUM 100 MG/1
100 CAPSULE, LIQUID FILLED ORAL 2 TIMES DAILY
Qty: 60 CAPSULE | Refills: 2 | Status: SHIPPED | OUTPATIENT
Start: 2022-12-31 | End: 2023-03-31

## 2022-12-31 RX ORDER — ACETAMINOPHEN 500 MG
1000 TABLET ORAL ONCE
Status: COMPLETED | OUTPATIENT
Start: 2022-12-31 | End: 2022-12-31

## 2022-12-31 RX ADMIN — ACETAMINOPHEN 1000 MG: 500 TABLET ORAL at 21:27

## 2022-12-31 NOTE — Clinical Note
[address]  [address] [facility] [address]  [phone]    Work/School Note    Date: 12/31/2022    To Whom It May concern:    Cristian Tom was seen and treated today in the emergency room by the following provider(s):  Attending Provider: Cathy Vanegas MD.      Cristian Tom is excused from work/school on 12/31/2022 through 1/2/2023. She is medically clear to return to work/school on 1/3/2023.          Sincerely,          Uri Aguila MD

## 2023-01-01 NOTE — ED PROVIDER NOTES
EmergencyEMERGENCY DEPARTMENT HISTORY AND PHYSICAL EXAM      Date: 12/31/2022  Patient Name: Brigette Goldman    History of Presenting Illness     Chief Complaint   Patient presents with    Abdominal Pain       History Provided By: Patient    HPI: Brigette Goldman, 33 y.o. female   presents to the ED with cc of suprapubic abdominal pain. Patient was currently 10 weeks gestation of pregnancy complaining of intermittent episode of sharp stabbing suprapubic discomfort since this afternoon. No vaginal bleeding. Patient had a ultrasound recently by her GYN and confirmed IUP in early stage as per patient. No dysuria hematuria. No fever chills. No OTC treatment. PCP: David Glaser MD    No current facility-administered medications on file prior to encounter. Current Outpatient Medications on File Prior to Encounter   Medication Sig Dispense Refill    progesterone (PROMETRIUM) 200 mg capsule Insert one capsule intravaginally at bedtime 60 Capsule 2    terconazole (TERAZOL 7) 0.4 % vaginal cream Insert 1 Applicator into vagina nightly. 45 g 0    pseudoephedrine CR (Sudafed 12 Hour) 120 mg CR tablet Take 1 Tablet by mouth two (2) times daily as needed for Congestion. (Patient not taking: Reported on 12/5/2022) 20 Tablet 0    norgestimate-ethinyl estradioL (ORTHO-CYCLEN, SPRINTEC) 0.25-35 mg-mcg tab Take 1 Tablet by mouth daily. (Patient not taking: Reported on 12/5/2022) 28 Dose Pack 10    [DISCONTINUED] ibuprofen (MOTRIN) 800 mg tablet Take 1 Tablet by mouth every eight (8) hours as needed for Pain. Indications: a type of joint disorder due to excess uric acid in the blood called gout 40 Tablet 0    calcium-cholecalciferol, d3, (CALCIUM 600 + D) 600-125 mg-unit tab Take  by mouth. (Patient not taking: Reported on 12/5/2022)         Past History     Past Medical History:  History reviewed. No pertinent past medical history. Past Surgical History:  History reviewed.  No pertinent surgical history. Family History:  History reviewed. No pertinent family history. Social History:  Social History     Tobacco Use    Smoking status: Never    Smokeless tobacco: Never   Vaping Use    Vaping Use: Never used   Substance Use Topics    Alcohol use: Not Currently    Drug use: Not Currently       Allergies:  No Known Allergies      Review of Systems   Review of Systems   Constitutional:  Negative for chills and fever. HENT:  Negative for sore throat. Eyes:  Negative for discharge. Respiratory:  Negative for shortness of breath. Cardiovascular:  Negative for chest pain. Gastrointestinal:  Positive for constipation. Negative for vomiting. Genitourinary:  Negative for vaginal bleeding and vaginal discharge. Neurological:  Negative for headaches. Physical Exam   Physical Exam  Vitals and nursing note reviewed. Constitutional:       General: She is not in acute distress. Appearance: Normal appearance. She is well-developed. She is not ill-appearing or toxic-appearing. HENT:      Head: Normocephalic and atraumatic. Nose: No congestion. Mouth/Throat:      Mouth: Mucous membranes are moist.   Eyes:      Conjunctiva/sclera: Conjunctivae normal.   Cardiovascular:      Rate and Rhythm: Regular rhythm. Heart sounds: Normal heart sounds. Pulmonary:      Effort: Pulmonary effort is normal.      Breath sounds: Normal breath sounds. Abdominal:      General: Bowel sounds are normal.      Palpations: Abdomen is soft. Tenderness: There is no abdominal tenderness. There is no guarding or rebound. Musculoskeletal:         General: No deformity. Cervical back: Neck supple. Right lower leg: No edema. Left lower leg: No edema. Skin:     General: Skin is warm and dry. Neurological:      General: No focal deficit present. Mental Status: She is alert.    Psychiatric:         Mood and Affect: Mood normal.       Diagnostic Study Results     Labs -     Recent Results (from the past 12 hour(s))   URINALYSIS W/ RFLX MICROSCOPIC    Collection Time: 12/31/22  9:00 PM   Result Value Ref Range    Color Yellow      Appearance Clear Clear      Specific gravity 1.010 1.003 - 1.030      pH (UA) 8.0 5.0 - 8.0      Protein Negative Negative mg/dL    Glucose Negative Negative mg/dL    Ketone Negative Negative mg/dL    Bilirubin Negative Negative      Blood Negative Negative      Urobilinogen 0.1 (L) 0.2 - 1.0 EU/dL    Nitrites Negative Negative      Leukocyte Esterase Negative Negative      WBC 0-4 0 - 4 /hpf    RBC 0-5 0 - 5 /hpf    Epithelial cells Few Few /lpf    Bacteria 1+ (A) Negative /hpf       Radiologic Studies -   No orders to display     CT Results  (Last 48 hours)      None          CXR Results  (Last 48 hours)      None              Medical Decision Making   I am the first provider for this patient. I reviewed the vital signs, available nursing notes, past medical history, past surgical history, family history and social history. Vital Signs-Reviewed the patient's vital signs. Patient Vitals for the past 12 hrs:   Temp Pulse Resp BP SpO2   12/31/22 2047 98.2 °F (36.8 °C) 81 17 133/82 100 %       Records Reviewed:     Provider Notes (Medical Decision Making):   Patient presents with suprapubic abdominal discomfort currently 10 weeks gestation of pregnancy. Ectopic is unlikely given her recent ultrasound confirming IUP. No clinical signs of miscarriage. UA was clear for UTI. Patient was symptomatically treated with a dose of Tylenol for suprapubic discomfort and advised to return to emergency room for any signs of miscarriage otherwise follow-up with her OB. ED Course:   Initial assessment performed. The patients presenting problems have been discussed, and they are in agreement with the care plan formulated and outlined with them. I have encouraged them to ask questions as they arise throughout their visit.            PROCEDURES      Disposition: Condition stable   DC- Adult Discharges: All of the diagnostic tests were reviewed and questions answered. Diagnosis, care plan and treatment options were discussed. understand instructions and will follow up as directed. The patients results have been reviewed with them. They have been counseled regarding their diagnosis. The patient verbally convey understanding and agreement of the signs, symptoms, diagnosis, treatment and prognosis and additionally agrees to follow up as recommended. They also agree with the care-plan and convey that all of their questions have been answered. I have also put together some discharge instructions for them that include: 1) educational information regarding their diagnosis, 2) how to care for their diagnosis at home, as well a 3) list of reasons why they would want to return to the ED prior to their follow-up appointment, should their condition change. PLAN:  1. Current Discharge Medication List        START taking these medications    Details   docusate sodium (Colace) 100 mg capsule Take 1 Capsule by mouth two (2) times a day for 90 days. Qty: 60 Capsule, Refills: 2  Start date: 12/31/2022, End date: 3/31/2023           2. Follow-up Information       Follow up With Specialties Details Why Contact Info    Follow up with your GYN/OB doctor in 1-3 days              Return to ED if worse     Diagnosis     Clinical Impression:   1. Pelvic pain during pregnancy    2. Constipation, unspecified constipation type        Please note that this dictation was completed with [employer], the computer voice recognition software. Quite often unanticipated grammatical, syntax, homophones, and other interpretive errors are inadvertently transcribed by the computer software. Please disregard these errors. Please excuse any errors that have escaped final proofreading. Thank you.

## 2023-01-01 NOTE — ED TRIAGE NOTES
Pt presents with sharp lower abdominal pains that started this afternoon. Pt reports she is 10 weeks pregnant as well.

## 2023-01-05 ENCOUNTER — ROUTINE PRENATAL (OUTPATIENT)
Dept: OBGYN CLINIC | Age: 33
End: 2023-01-05
Payer: COMMERCIAL

## 2023-01-05 VITALS
HEIGHT: 67 IN | OXYGEN SATURATION: 98 % | SYSTOLIC BLOOD PRESSURE: 131 MMHG | DIASTOLIC BLOOD PRESSURE: 78 MMHG | RESPIRATION RATE: 16 BRPM | HEART RATE: 90 BPM | WEIGHT: 252 LBS | BODY MASS INDEX: 39.55 KG/M2

## 2023-01-05 DIAGNOSIS — Z3A.11 11 WEEKS GESTATION OF PREGNANCY: ICD-10-CM

## 2023-01-05 DIAGNOSIS — N88.3 INCOMPETENCE OF CERVIX: Primary | ICD-10-CM

## 2023-01-05 NOTE — LETTER
NOTIFICATION RETURN TO WORK / SCHOOL    1/5/2023     Ms. [address] [place] [address] [facility] [address]      To Whom It May Concern:    Beverly Lebron is currently under the care of [address] Patient is not allowed to do any pushing or pulling and no lifting over 5 pounds. She will return to work/school on: 01/05/2023    If there are questions or concerns please have the patient contact our office.         Sincerely,      Stanton Meza MD

## 2023-01-05 NOTE — LETTER
NOTIFICATION RETURN TO WORK / SCHOOL    1/5/2023     Ms. [address]  [facility] [address]      To Whom It May Concern:    Eliana Standard is currently under thePatient is not allowed to do any pushing or pulling and no lifting over 5 pounds. .     She will return to work/school on: 01-    If there are questions or concerns please have the patient contact our office.         Sincerely,      Cris Recio MD

## 2023-01-05 NOTE — LETTER
NOTIFICATION RETURN TO WORK / SCHOOL    1/5/2023     Ms. [address]  [facility] [address]      To Whom It May Concern:    Naoma Mortimer is currently under the care of [address]. Please allow patient to stay off her feet while at work during her pregnancy. She will return to work/school on: 01/06/2023    If there are questions or concerns please have the patient contact our office.         Sincerely,      Areli Chris MD

## 2023-01-05 NOTE — PROGRESS NOTES
Patient c/o cramping since Dec 30th and went to the Er and they treated her for constipation.
SUBJECTIVE:  Nhi Jay presents today for return prenatal visit. She complains of: no unusual complaints. Patient's medical, obstetrical, social, and family histories; medications; and lab results have been reviewed. REVIEW OF SYSTEMS: A comprehensive review of systems was negative except for that written in the HPI.    11w4d    OBJECTIVE:   Visit Vitals  /78 (BP 1 Location: Right upper arm, BP Patient Position: Sitting)   Pulse 90   Resp 16   Ht 5' 7\" (1.702 m)   Wt 252 lb (114.3 kg)   LMP 11/20/2022   SpO2 98%   BMI 39.47 kg/m²      Refer to Prenatal Physical for exam findings    ASSESSMENT/PLAN:  Prenatal Education: bedrest/ pelvic rest due to previous poor ob hx, scheduled to see mfm  Patient/guardian understands and agrees with the plan of care.
show

## 2023-01-09 ENCOUNTER — TELEPHONE (OUTPATIENT)
Dept: OBGYN CLINIC | Age: 33
End: 2023-01-09

## 2023-01-09 NOTE — TELEPHONE ENCOUNTER
Patient called stating the note she was given on 01/05/23 needs to be updated to reflect she needs to be on light duty until further notice. She is scheduled to see M later this  month for evaluation of cerclage. Updated note written and placed at the  for the patient to .

## 2023-02-02 ENCOUNTER — ROUTINE PRENATAL (OUTPATIENT)
Dept: OBGYN CLINIC | Age: 33
End: 2023-02-02
Payer: COMMERCIAL

## 2023-02-02 VITALS
WEIGHT: 259 LBS | SYSTOLIC BLOOD PRESSURE: 130 MMHG | OXYGEN SATURATION: 97 % | HEART RATE: 97 BPM | BODY MASS INDEX: 40.65 KG/M2 | HEIGHT: 67 IN | DIASTOLIC BLOOD PRESSURE: 82 MMHG | RESPIRATION RATE: 16 BRPM

## 2023-02-02 DIAGNOSIS — Z3A.15 15 WEEKS GESTATION OF PREGNANCY: ICD-10-CM

## 2023-02-02 DIAGNOSIS — N88.3 INCOMPETENCE OF CERVIX: Primary | ICD-10-CM

## 2023-02-02 PROCEDURE — 0502F SUBSEQUENT PRENATAL CARE: CPT | Performed by: OBSTETRICS & GYNECOLOGY

## 2023-02-02 NOTE — PROGRESS NOTES
SUBJECTIVE:  Farrah Wright presents today for return prenatal visit. She complains of: no unusual complaints. S/p cerclage placement    Patient's medical, obstetrical, social, and family histories; medications; and lab results have been reviewed. REVIEW OF SYSTEMS: A comprehensive review of systems was negative except for that written in the HPI. 15w4d    OBJECTIVE:   Visit Vitals  /82 (BP 1 Location: Right upper arm, BP Patient Position: Sitting)   Pulse 97   Resp 16   Ht 5' 7\" (1.702 m)   Wt 259 lb (117.5 kg)   LMP 2022   SpO2 97%   BMI 40.57 kg/m²      Refer to Prenatal Physical for exam findings    ASSESSMENT/PLAN:  Prenatal Education: 2nd trimester topics:  pregnancy danger signs and signs and symptoms of  labor  Patient/guardian understands and agrees with the plan of care.

## 2023-02-02 NOTE — PATIENT INSTRUCTIONS
Counting Your Baby's Kicks: Care Instructions  Overview     Counting your baby's kicks is one way your doctor can tell that your baby is healthy. Most women--especially in a first pregnancy--feel their baby move for the first time between 16 and 22 weeks. The movement may feel like flutters rather than kicks. Your baby may move more at certain times of the day. When you are active, you may notice less kicking than when you are resting. At your prenatal visits, your doctor will ask whether the baby is active. In your last trimester, your doctor may ask you to count the number of times you feel your baby move. Follow-up care is a key part of your treatment and safety. Be sure to make and go to all appointments, and call your doctor if you are having problems. It's also a good idea to know your test results and keep a list of the medicines you take. How do you count fetal kicks? A common method of checking your baby's movement is to note the length of time it takes to count ten movements (such as kicks, flutters, or rolls). Pick your baby's most active time of day to count. This may be any time from morning to evening. If you don't feel 10 movements in an hour, have something to eat or drink and count for another hour. If you don't feel at least 10 movements in the 2-hour period, call your doctor. When should you call for help? Call your doctor now or seek immediate medical care if:    You noticed that your baby has stopped moving or is moving much less than normal.   Watch closely for changes in your health, and be sure to contact your doctor if you have any problems. Where can you learn more? Go to http://www.gray.com/  Enter [id number] in the search box to learn more about \"Counting Your Baby's Kicks: Care Instructions. \"  Current as of: February 23, 2022               Content Version: 13.4  © 7920-2280 Healthwise, [employer]    Care instructions adapted under license by [employer] (which disclaims liability or warranty for this information). If you have questions about a medical condition or this instruction, always ask your healthcare professional. [address] any warranty or liability for your use of this information.

## 2023-02-07 LAB
2ND TRIMESTER 4 SCREEN SERPL-IMP: NORMAL
AFP ADJ MOM SERPL: 0.83
AFP SERPL-MCNC: 22.1 NG/ML
AGE AT DELIVERY: 31.6 YR
FET TS 18 RISK FROM MAT AGE: NORMAL
FET TS 21 RISK FROM MAT AGE: 561
GA METHOD: NORMAL
GA: 15.6 WEEKS
HCG ADJ MOM SERPL: 1.16
HCG SERPL-ACNC: NORMAL MIU/ML
HCV AB S/CO SERPL IA: <0.1 S/CO RATIO (ref 0–0.9)
HCV AB SERPL QL IA: NORMAL
IDDM PATIENT QL: NO
INHIBIN A ADJ MOM SERPL: 0.41
INHIBIN A SERPL-MCNC: 50.26 PG/ML
MULTIPLE PREGNANCY: NO
NEURAL TUBE DEFECT RISK FETUS: NORMAL %
SERVICE CMNT-IMP: NORMAL
TS 18 RISK FETUS: NORMAL
TS 21 RISK FETUS: NORMAL
U ESTRIOL ADJ MOM SERPL: 1.21
U ESTRIOL SERPL-MCNC: 0.94 NG/ML

## 2023-02-22 ENCOUNTER — TELEPHONE (OUTPATIENT)
Dept: OBGYN CLINIC | Age: 33
End: 2023-02-22

## 2023-02-22 NOTE — TELEPHONE ENCOUNTER
Returned a call to [facility] regarding a message left on the triage voicemail requesting a prior authorization. Advised the representative that the patient is stating she only has [employer]. Spoke with the patient and she states once again she only has [employer] and will contact her .

## 2023-02-23 ENCOUNTER — TELEPHONE (OUTPATIENT)
Dept: OBGYN CLINIC | Age: 33
End: 2023-02-23

## 2023-02-23 NOTE — TELEPHONE ENCOUNTER
Returned the patient's call and she states she spoke with a [employer] and was told that as of today her insurance will be cancelled. States she is supposed to receive documentation in the mail. [facility] and advised Rodney Bravo of the updated information. Copy of the letter from [employer] faxed to Martin at [phone].

## 2023-02-24 ENCOUNTER — TELEPHONE (OUTPATIENT)
Dept: OBGYN CLINIC | Age: 33
End: 2023-02-24

## 2023-02-24 NOTE — TELEPHONE ENCOUNTER
Patient contacted office and left voicemail that she would like nurse to contact her to talk about prescription.

## 2023-02-24 NOTE — TELEPHONE ENCOUNTER
Patient called stating she received a call letting her know her Letaba was approved. Advised her to contact the pharmacy regarding scheduling shipment.

## 2023-02-24 NOTE — TELEPHONE ENCOUNTER
Spoke with the patient regarding the prior auth needed for [facility]. Forms filled out and faxed.

## 2023-02-28 DIAGNOSIS — O09.292 HISTORY OF MISCARRIAGE, CURRENTLY PREGNANT, SECOND TRIMESTER: Primary | ICD-10-CM

## 2023-02-28 RX ORDER — HYDROXYPROGESTERONE CAPROATE 250 MG/ML
INJECTION SUBCUTANEOUS
Qty: 4 ML | Refills: 4
Start: 2023-02-28

## 2023-03-02 DIAGNOSIS — O09.292 HISTORY OF MISCARRIAGE, CURRENTLY PREGNANT, SECOND TRIMESTER: ICD-10-CM

## 2023-03-06 NOTE — TELEPHONE ENCOUNTER
Spoke with the patient and she states she received her medication on 03/03/23 and will bring it with her to her appointment on 03/07/23. Medical authorization number for the Minna Rosario is [id number] and is good 03/02/23 - 07/27/23.

## 2023-03-07 ENCOUNTER — ROUTINE PRENATAL (OUTPATIENT)
Dept: OBGYN CLINIC | Age: 33
End: 2023-03-07

## 2023-03-07 ENCOUNTER — HOSPITAL ENCOUNTER (INPATIENT)
Age: 33
LOS: 9 days | Discharge: ANOTHER HOSPITAL | End: 2023-03-17
Attending: OBSTETRICS & GYNECOLOGY | Admitting: OBSTETRICS & GYNECOLOGY
Payer: MEDICAID

## 2023-03-07 ENCOUNTER — APPOINTMENT (OUTPATIENT)
Dept: ULTRASOUND IMAGING | Age: 33
End: 2023-03-07
Attending: OBSTETRICS & GYNECOLOGY
Payer: MEDICAID

## 2023-03-07 VITALS
DIASTOLIC BLOOD PRESSURE: 77 MMHG | HEIGHT: 67 IN | SYSTOLIC BLOOD PRESSURE: 149 MMHG | BODY MASS INDEX: 42.38 KG/M2 | WEIGHT: 270 LBS

## 2023-03-07 DIAGNOSIS — Z3A.20 20 WEEKS GESTATION OF PREGNANCY: ICD-10-CM

## 2023-03-07 DIAGNOSIS — N88.3 INCOMPETENCE OF CERVIX: Primary | ICD-10-CM

## 2023-03-07 PROBLEM — Z34.90 PREGNANCY: Status: ACTIVE | Noted: 2023-03-07

## 2023-03-07 LAB
APPEARANCE UR: CLEAR
BACTERIA URNS QL MICRO: NEGATIVE /HPF
BILIRUB UR QL: NEGATIVE
COLOR UR: ABNORMAL
GLUCOSE UR STRIP.AUTO-MCNC: NEGATIVE MG/DL
HGB UR QL STRIP: ABNORMAL
KETONES UR QL STRIP.AUTO: NEGATIVE MG/DL
LEUKOCYTE ESTERASE UR QL STRIP.AUTO: NEGATIVE
NITRITE UR QL STRIP.AUTO: NEGATIVE
PH UR STRIP: 6 (ref 5–8)
PROT UR STRIP-MCNC: NEGATIVE MG/DL
RBC #/AREA URNS HPF: ABNORMAL /HPF (ref 0–5)
SP GR UR REFRACTOMETRY: 1.02 (ref 1–1.03)
SURFACTANT/ALB AMN: POSITIVE
UROBILINOGEN UR QL STRIP.AUTO: 0.1 EU/DL (ref 0.1–1)
WBC URNS QL MICRO: ABNORMAL /HPF (ref 0–4)

## 2023-03-07 PROCEDURE — 4A1HXCZ MONITORING OF PRODUCTS OF CONCEPTION, CARDIAC RATE, EXTERNAL APPROACH: ICD-10-PCS | Performed by: OBSTETRICS & GYNECOLOGY

## 2023-03-07 PROCEDURE — 81001 URINALYSIS AUTO W/SCOPE: CPT

## 2023-03-07 PROCEDURE — 76815 OB US LIMITED FETUS(S): CPT

## 2023-03-07 PROCEDURE — 87147 CULTURE TYPE IMMUNOLOGIC: CPT

## 2023-03-07 PROCEDURE — 87070 CULTURE OTHR SPECIMN AEROBIC: CPT

## 2023-03-07 PROCEDURE — 84112 EVAL AMNIOTIC FLUID PROTEIN: CPT

## 2023-03-07 PROCEDURE — 74011250636 HC RX REV CODE- 250/636: Performed by: OBSTETRICS & GYNECOLOGY

## 2023-03-07 PROCEDURE — 99214 OFFICE O/P EST MOD 30 MIN: CPT | Performed by: OBSTETRICS & GYNECOLOGY

## 2023-03-07 PROCEDURE — 74011000250 HC RX REV CODE- 250: Performed by: OBSTETRICS & GYNECOLOGY

## 2023-03-07 PROCEDURE — 87491 CHLMYD TRACH DNA AMP PROBE: CPT

## 2023-03-07 RX ORDER — SODIUM CHLORIDE, SODIUM LACTATE, POTASSIUM CHLORIDE, CALCIUM CHLORIDE 600; 310; 30; 20 MG/100ML; MG/100ML; MG/100ML; MG/100ML
200 INJECTION, SOLUTION INTRAVENOUS CONTINUOUS
Status: DISCONTINUED | OUTPATIENT
Start: 2023-03-07 | End: 2023-03-10

## 2023-03-07 RX ADMIN — CEFAZOLIN 2 G: 1 INJECTION, POWDER, FOR SOLUTION INTRAMUSCULAR; INTRAVENOUS at 22:38

## 2023-03-07 RX ADMIN — SODIUM CHLORIDE, POTASSIUM CHLORIDE, SODIUM LACTATE AND CALCIUM CHLORIDE 999 ML/HR: 600; 310; 30; 20 INJECTION, SOLUTION INTRAVENOUS at 22:01

## 2023-03-07 NOTE — PROGRESS NOTES
SUBJECTIVE:  Ольга Dennis presents today for return prenatal visit. She complains of: no unusual complaints. +FM    Patient's medical, obstetrical, social, and family histories; medications; and lab results have been reviewed. REVIEW OF SYSTEMS: A comprehensive review of systems was negative except for that written in the HPI.    20w2d    OBJECTIVE:   Visit Vitals  BP (!) 149/77 (BP 1 Location: Left upper arm, BP Patient Position: Sitting)   Ht 5' 7\" (1.702 m)   Wt 270 lb (122.5 kg)   LMP 2022   BMI 42.29 kg/m²      Refer to Prenatal Physical for exam findings    ASSESSMENT/PLAN:  Prenatal Education: 2nd trimester topics:  fetal movement count handout given and signs and symptoms of  labor. Pat given today  Patient/guardian understands and agrees with the plan of care.

## 2023-03-07 NOTE — PATIENT INSTRUCTIONS
Counting Your Baby's Kicks: Care Instructions  Overview     Counting your baby's kicks is one way your doctor can tell that your baby is healthy. Most women--especially in a first pregnancy--feel their baby move for the first time between 16 and 22 weeks. The movement may feel like flutters rather than kicks. Your baby may move more at certain times of the day. When you are active, you may notice less kicking than when you are resting. At your prenatal visits, your doctor will ask whether the baby is active. In your last trimester, your doctor may ask you to count the number of times you feel your baby move. Follow-up care is a key part of your treatment and safety. Be sure to make and go to all appointments, and call your doctor if you are having problems. It's also a good idea to know your test results and keep a list of the medicines you take. How do you count fetal kicks? A common method of checking your baby's movement is to note the length of time it takes to count ten movements (such as kicks, flutters, or rolls). Pick your baby's most active time of day to count. This may be any time from morning to evening. If you don't feel 10 movements in an hour, have something to eat or drink and count for another hour. If you don't feel at least 10 movements in the 2-hour period, call your doctor. When should you call for help? Call your doctor now or seek immediate medical care if:    You noticed that your baby has stopped moving or is moving much less than normal.   Watch closely for changes in your health, and be sure to contact your doctor if you have any problems. Where can you learn more? Go to http://www.gray.com/  Enter [id number] in the search box to learn more about \"Counting Your Baby's Kicks: Care Instructions. \"  Current as of: February 23, 2022               Content Version: 13.4  © 6814-1231 Healthwise, [employer].    Care instructions adapted under license by [employer] (which disclaims liability or warranty for this information). If you have questions about a medical condition or this instruction, always ask your healthcare professional. [address] any warranty or liability for your use of this information.

## 2023-03-07 NOTE — PROGRESS NOTES
[phone] Patient brought in via squad from home with complaints of \"large amount of fluid gushing from vagina\" Patient is 20 weeks pregnant with a cerclage placed due to numerous previous losses. Patient states she had a normal OB visit with DR. Arty Gitelman and was started on [place] shots. Patient states after the shots she experienced cramping, fell asleep, and when she woke up she went to the bathroom and had a large gush of clear fluid that continued to leak. Patient had a rag in her pants when arrived that was damp. Dr. Ashley David notified of patient arrival. Orders received for a ROM+, which was sent. FHT found with Doppler. .

## 2023-03-08 PROBLEM — N88.3 SHORT CERVIX: Status: ACTIVE | Noted: 2023-03-08

## 2023-03-08 PROBLEM — Z3A.20 20 WEEKS GESTATION OF PREGNANCY: Status: ACTIVE | Noted: 2023-03-08

## 2023-03-08 PROBLEM — O42.90 PROM (PREMATURE RUPTURE OF MEMBRANES): Status: ACTIVE | Noted: 2023-03-08

## 2023-03-08 PROBLEM — O34.30 CERVICAL CERCLAGE SUTURE PRESENT: Status: ACTIVE | Noted: 2023-03-08

## 2023-03-08 PROBLEM — O42.919 PRETERM PREMATURE RUPTURE OF MEMBRANES: Status: ACTIVE | Noted: 2020-09-10

## 2023-03-08 LAB
BASOPHILS # BLD: 0 K/UL (ref 0–0.1)
BASOPHILS NFR BLD: 0 % (ref 0–1)
DIFFERENTIAL METHOD BLD: ABNORMAL
EOSINOPHIL # BLD: 0.1 K/UL (ref 0–0.4)
EOSINOPHIL NFR BLD: 1 % (ref 0–7)
ERYTHROCYTE [DISTWIDTH] IN BLOOD BY AUTOMATED COUNT: 14.7 % (ref 11.5–14.5)
HCT VFR BLD AUTO: 29.5 % (ref 35–47)
HGB BLD-MCNC: 9.7 G/DL (ref 11.5–16)
IMM GRANULOCYTES # BLD AUTO: 0.1 K/UL (ref 0–0.04)
IMM GRANULOCYTES NFR BLD AUTO: 1 % (ref 0–0.5)
LYMPHOCYTES # BLD: 2.2 K/UL (ref 0.8–3.5)
LYMPHOCYTES NFR BLD: 17 % (ref 12–49)
MCH RBC QN AUTO: 27 PG (ref 26–34)
MCHC RBC AUTO-ENTMCNC: 32.9 G/DL (ref 30–36.5)
MCV RBC AUTO: 82.2 FL (ref 80–99)
MONOCYTES # BLD: 0.8 K/UL (ref 0–1)
MONOCYTES NFR BLD: 6 % (ref 5–13)
NEUTS SEG # BLD: 10.1 K/UL (ref 1.8–8)
NEUTS SEG NFR BLD: 75 % (ref 32–75)
NRBC # BLD: 0 K/UL (ref 0–0.01)
NRBC BLD-RTO: 0 PER 100 WBC
PLATELET # BLD AUTO: 197 K/UL (ref 150–400)
PMV BLD AUTO: 10.9 FL (ref 8.9–12.9)
RBC # BLD AUTO: 3.59 M/UL (ref 3.8–5.2)
TRICHOMONAS RAPID AG, TRICR: NEGATIVE
WBC # BLD AUTO: 13.3 K/UL (ref 3.6–11)

## 2023-03-08 PROCEDURE — 96375 TX/PRO/DX INJ NEW DRUG ADDON: CPT

## 2023-03-08 PROCEDURE — 87808 TRICHOMONAS ASSAY W/OPTIC: CPT

## 2023-03-08 PROCEDURE — 96361 HYDRATE IV INFUSION ADD-ON: CPT

## 2023-03-08 PROCEDURE — 74011250636 HC RX REV CODE- 250/636: Performed by: OBSTETRICS & GYNECOLOGY

## 2023-03-08 PROCEDURE — 85025 COMPLETE CBC W/AUTO DIFF WBC: CPT

## 2023-03-08 PROCEDURE — 36415 COLL VENOUS BLD VENIPUNCTURE: CPT

## 2023-03-08 PROCEDURE — 74011250637 HC RX REV CODE- 250/637: Performed by: OBSTETRICS & GYNECOLOGY

## 2023-03-08 PROCEDURE — 74011000250 HC RX REV CODE- 250: Performed by: OBSTETRICS & GYNECOLOGY

## 2023-03-08 PROCEDURE — 74011000258 HC RX REV CODE- 258: Performed by: OBSTETRICS & GYNECOLOGY

## 2023-03-08 PROCEDURE — 96360 HYDRATION IV INFUSION INIT: CPT

## 2023-03-08 PROCEDURE — 96374 THER/PROPH/DIAG INJ IV PUSH: CPT

## 2023-03-08 PROCEDURE — 65410000002 HC RM PRIVATE OB

## 2023-03-08 RX ORDER — ONDANSETRON 4 MG/1
4 TABLET, ORALLY DISINTEGRATING ORAL
Status: DISCONTINUED | OUTPATIENT
Start: 2023-03-08 | End: 2023-03-17 | Stop reason: HOSPADM

## 2023-03-08 RX ORDER — ACETAMINOPHEN 325 MG/1
650 TABLET ORAL ONCE
Status: COMPLETED | OUTPATIENT
Start: 2023-03-08 | End: 2023-03-08

## 2023-03-08 RX ORDER — CALCIUM CARBONATE 200(500)MG
200 TABLET,CHEWABLE ORAL
Status: DISCONTINUED | OUTPATIENT
Start: 2023-03-08 | End: 2023-03-17 | Stop reason: HOSPADM

## 2023-03-08 RX ORDER — ZOLPIDEM TARTRATE 5 MG/1
5 TABLET ORAL
Status: DISCONTINUED | OUTPATIENT
Start: 2023-03-08 | End: 2023-03-17 | Stop reason: HOSPADM

## 2023-03-08 RX ORDER — DOCUSATE SODIUM 100 MG/1
100 CAPSULE, LIQUID FILLED ORAL
Status: DISCONTINUED | OUTPATIENT
Start: 2023-03-08 | End: 2023-03-16

## 2023-03-08 RX ORDER — ACETAMINOPHEN 325 MG/1
650 TABLET ORAL
Status: DISCONTINUED | OUTPATIENT
Start: 2023-03-08 | End: 2023-03-17 | Stop reason: HOSPADM

## 2023-03-08 RX ADMIN — AMPICILLIN SODIUM 2 G: 2 INJECTION, POWDER, FOR SOLUTION INTRAMUSCULAR; INTRAVENOUS at 10:20

## 2023-03-08 RX ADMIN — DOCUSATE SODIUM 100 MG: 100 CAPSULE, LIQUID FILLED ORAL at 22:19

## 2023-03-08 RX ADMIN — AZITHROMYCIN DIHYDRATE 500 MG: 500 INJECTION, POWDER, LYOPHILIZED, FOR SOLUTION INTRAVENOUS at 11:30

## 2023-03-08 RX ADMIN — AMPICILLIN SODIUM 2 G: 2 INJECTION, POWDER, FOR SOLUTION INTRAMUSCULAR; INTRAVENOUS at 16:25

## 2023-03-08 RX ADMIN — ACETAMINOPHEN 650 MG: 325 TABLET ORAL at 03:43

## 2023-03-08 RX ADMIN — ACETAMINOPHEN 650 MG: 325 TABLET ORAL at 10:25

## 2023-03-08 RX ADMIN — SODIUM CHLORIDE, POTASSIUM CHLORIDE, SODIUM LACTATE AND CALCIUM CHLORIDE 200 ML/HR: 600; 310; 30; 20 INJECTION, SOLUTION INTRAVENOUS at 03:44

## 2023-03-08 RX ADMIN — AMPICILLIN SODIUM 2 G: 2 INJECTION, POWDER, FOR SOLUTION INTRAMUSCULAR; INTRAVENOUS at 22:20

## 2023-03-08 RX ADMIN — ACETAMINOPHEN 650 MG: 325 TABLET ORAL at 22:19

## 2023-03-08 RX ADMIN — SODIUM CHLORIDE, POTASSIUM CHLORIDE, SODIUM LACTATE AND CALCIUM CHLORIDE 200 ML/HR: 600; 310; 30; 20 INJECTION, SOLUTION INTRAVENOUS at 08:48

## 2023-03-08 RX ADMIN — CEFAZOLIN 2 G: 1 INJECTION, POWDER, FOR SOLUTION INTRAMUSCULAR; INTRAVENOUS at 03:44

## 2023-03-08 RX ADMIN — SODIUM CHLORIDE, POTASSIUM CHLORIDE, SODIUM LACTATE AND CALCIUM CHLORIDE 200 ML/HR: 600; 310; 30; 20 INJECTION, SOLUTION INTRAVENOUS at 14:15

## 2023-03-08 RX ADMIN — SODIUM CHLORIDE, POTASSIUM CHLORIDE, SODIUM LACTATE AND CALCIUM CHLORIDE 200 ML/HR: 600; 310; 30; 20 INJECTION, SOLUTION INTRAVENOUS at 19:20

## 2023-03-08 RX ADMIN — PRENATAL VIT W/ FE FUMARATE-FA TAB 27-0.8 MG 1 TABLET: 27-0.8 TAB at 10:38

## 2023-03-08 NOTE — PROGRESS NOTES
High Risk Obstetrics Progress Note    Name: Kailee Moulton MRN: [id number]  SSN: [id number]    YOB: 1990  Age: 33 y.o. Sex: female      Subjective:      LOS: 0 days    Estimated Date of Delivery: 23   Gestational Age Today: 20w3d     Patient admitted for PPROM at [address] on 3/7/23. S: Patient states she feels mild cramping in lower abdomen q 5 mins, same as when she first arrived. She had vaginal spotting when she arrived and it is decreased to light spotting. +FM. +LOF. Denies f/c.     Objective:     Vitals:  Blood pressure 118/60, pulse 90, temperature 98.3 °F (36.8 °C), resp. rate 16, height 5' 7\" (1.702 m), weight 270 lb (122.5 kg), last menstrual period 2022, SpO2 100 %. Temp (24hrs), Av.7 °F (37.1 °C), Min:98.1 °F (36.7 °C), Max:99.6 °F (63.8 °C)    Systolic (43WHY), ABR:487 , Min:110 , PB      Diastolic (95ENT), CCZ:48, Min:27, Max:92       Intake and Output:     Date 23 0700 - 23 0659   Shift 2061-4100 8757-6816 8086-5010 24 Hour Total   INTAKE   Shift Total(mL/kg)       OUTPUT   Urine(mL/kg/hr) 500   500   Shift Total(mL/kg) 500(4.1)   500(4.1)   Weight (kg) 122.5 122.5 122.5 122.5       Physical Exam:  Physical Exam  Constitutional:       General: She is awake. She is not in acute distress. Appearance: Normal appearance. She is obese. She is not ill-appearing. HENT:      Head: Normocephalic and atraumatic. Eyes:      Extraocular Movements: Extraocular movements intact. Pulmonary:      Effort: Pulmonary effort is normal.   Abdominal:      Comments: Gravid, NT   Musculoskeletal:      Cervical back: Normal range of motion. Right lower leg: No edema. Left lower leg: No edema. Neurological:      Mental Status: She is alert and oriented to person, place, and time. Skin:     General: Skin is warm and dry. Psychiatric:         Mood and Affect: Mood normal.         Behavior: Behavior normal. Behavior is cooperative.         FHR: 150s bpm        Labs:   Recent Results (from the past 36 hour(s))   RUPTURE OF FETAL MEMBRANE    Collection Time: 03/07/23  6:40 PM   Result Value Ref Range    Rupture of fetal membrane Positive (A) Negative     URINALYSIS W/MICROSCOPIC    Collection Time: 03/07/23  8:00 PM   Result Value Ref Range    Color Yellow/Straw      Appearance Clear Clear      Specific gravity 1.018 1.003 - 1.030      pH (UA) 6.0 5.0 - 8.0      Protein Negative Negative mg/dL    Glucose Negative Negative mg/dL    Ketone Negative Negative mg/dL    Bilirubin Negative Negative      Blood Moderate (A) Negative      Urobilinogen 0.1 0.1 - 1.0 EU/dL    Nitrites Negative Negative      Leukocyte Esterase Negative Negative      WBC 0-4 0 - 4 /hpf    RBC  0 - 5 /hpf    Bacteria Negative Negative /hpf   CBC WITH AUTOMATED DIFF    Collection Time: 03/08/23  9:21 AM   Result Value Ref Range    WBC 13.3 (H) 3.6 - 11.0 K/uL    RBC 3.59 (L) 3.80 - 5.20 M/uL    HGB 9.7 (L) 11.5 - 16.0 g/dL    HCT 29.5 (L) 35.0 - 47.0 %    MCV 82.2 80.0 - 99.0 FL    MCH 27.0 26.0 - 34.0 PG    MCHC 32.9 30.0 - 36.5 g/dL    RDW 14.7 (H) 11.5 - 14.5 %    PLATELET 954 828 - 726 K/uL    MPV 10.9 8.9 - 12.9 FL    NRBC 0.0 0.0  WBC    ABSOLUTE NRBC 0.00 0.00 - 0.01 K/uL    NEUTROPHILS 75 32 - 75 %    LYMPHOCYTES 17 12 - 49 %    MONOCYTES 6 5 - 13 %    EOSINOPHILS 1 0 - 7 %    BASOPHILS 0 0 - 1 %    IMMATURE GRANULOCYTES 1 (H) 0 - 0.5 %    ABS. NEUTROPHILS 10.1 (H) 1.8 - 8.0 K/UL    ABS. LYMPHOCYTES 2.2 0.8 - 3.5 K/UL    ABS. MONOCYTES 0.8 0.0 - 1.0 K/UL    ABS. EOSINOPHILS 0.1 0.0 - 0.4 K/UL    ABS. BASOPHILS 0.0 0.0 - 0.1 K/UL    ABS. IMM. GRANS. 0.1 (H) 0.00 - 0.04 K/UL    DF AUTOMATED         INDICATION: possible ROM     COMPARISON: Ultrasound 1/5/2023 and 12/5/2022     EXAM: Real-time transabdominal pelvic ultrasound of the gravid uterus. 3/7/23     FINDINGS: A single intrauterine pregnancy is shown. There is substantial  oligohydramnios without definable fluid. Measurement. MANDO is estimated at 0.0  cm. Cervical length is 4 cm. The placenta is anterior in location and shows  uniform echotexture. Fetal position is vertex with fetal cardiac activity  measuring 165 bpm. Estimated gestational age by measurement of biparietal  diameter, head circumference, abdominal circumference and femur length ranges  from 18 weeks 5 days +/- 2 weeks 1 day with abdominal circumference to 19 weeks,  3 days and 19 weeks, 6 days with measurement of femur length head circumference,  and 20 weeks, 3 days with measurement of biparietal diameter. Ovaries are not  identified, obscured by bowel gas. IMPRESSION  Viable intrauterine pregnancy with oligohydramnios. Assessment and Plan:      Principal Problem:     premature rupture of membranes (9/10/2020)    Active Problems:    Pregnancy (3/7/2023)      20 weeks gestation of pregnancy (3/8/2023)      Cervical cerclage suture present (3/8/2023)      Short cervix (3/8/2023)      PROM (premature rupture of membranes) (3/8/2023)       -Discussed with patient that patient's outpatient ob doctor, Dr. Rachele Mayfield, would like for her to be admitted. She placed orders for PPROM antibiotics, interval CBCs, bedrest.  Discussed that I will contact maternal-fetal medicine to see if there are any other interventions or change in plan of care that they recommend. Dr Mary ortiz. Reviewed case with patient that she is previable so should she go into labor, cerclage would need to be cut. She understands that there is no fetal or neontal intervention at this early a gestational age. Dominique-viability is at 23 weeks. So she would get steroids starting 2 days prior with plan to attempt transfer to higher level of care at 23 weeks if she reaches that gestation. Discussed due to early gestation, there is chance of survival but also chance of fetal or  demise. Offered talking to neonatology but she declines for now.    Reviewed risk of lifelong neurological, pulmonary, GI sequelae due to prematurity status.   -We will monitor for infection or labor.   -Reg diet. -SCDs. Addendum:  Spoke with Dr Thanh Gentile, continue PPROM antibiotics. She states [facility] or [facility] would attempt res[facility] at 22wga. We can call MFM at 21w5d for a consult to see if she should get steroids at 21w5d with transfer at 22wga vs transfer/steroids at 22wga. The plan is MFM at [facility] will then call MFM at one of the aforementioned facilities to initiate a consult for the patient where they will offer her options and set up transfer.

## 2023-03-08 NOTE — H&P
History & Physical    Name: Johann Dumont MRN: [id number]  SSN: [id number]    YOB: 1990  Age: 33 y.o. Sex: female        Subjective:     Estimated Date of Delivery: 23  OB History    Para Term  AB Living   6 0     4 0   SAB IAB Ectopic Molar Multiple Live Births   4       0        # Outcome Date GA Lbr Srini/2nd Weight Sex Delivery Anes PTL Lv   6 Current            5 SAB 21 18w6d  7.4 oz (0.21 kg)  Vag-Spont None Y FD   4             3 SAB      SAB      2 SAB      SAB      1 SAB      SAB          Ms. Gabriela Trinh is admitted with pregnancy at 20w2d for Presumptive ROM . Pt was at home and states that she had gush of fluid, Denies sex, bleeding or contractions,  Prenatal course was complicated by cervical incompetence. s/p cervical cerclage,. Please see prenatal records for details. No past medical history on file. No past surgical history on file. Social History     Occupational History    Not on file   Tobacco Use    Smoking status: Never    Smokeless tobacco: Never   Vaping Use    Vaping Use: Never used   Substance and Sexual Activity    Alcohol use: Not Currently    Drug use: Not Currently    Sexual activity: Yes     Partners: Male     Birth control/protection: None     No family history on file. No Known Allergies  Prior to Admission medications    Medication Sig Start Date End Date Taking? Authorizing Provider   hydroxyprogesterone, PF, (BRET Stewart,) 275 mg/1.1 mL atIn subcutaneous injection Inject 275 mg SQ every 7 days 23  Yes Lobo Amos MD   docusate sodium (Colace) 100 mg capsule Take 1 Capsule by mouth two (2) times a day for 90 days.  12/31/22 3/31/23 Yes Shaw Garcia MD   progesterone (PROMETRIUM) 200 mg capsule Insert one capsule intravaginally at bedtime  Patient not taking: Reported on 3/7/2023 12/5/22   Lobo Amos MD   pseudoephedrine CR (Sudafed 12 Hour) 120 mg CR tablet Take 1 Tablet by mouth two (2) times daily as needed for Congestion. Patient not taking: No sig reported 7/14/22   Rom Flores MD        Review of Systems: A comprehensive review of systems was negative except for that written in the HPI. Objective:     Vitals:  Vitals:    03/07/23 1931 03/07/23 1946 03/07/23 2001 03/07/23 2016   BP: (!) 141/78 (!) 145/74 136/75 (!) 140/74   Pulse: 88 (!) 101 92 92   Resp:       Temp:       Weight:       Height:            Physical Exam:  Patient without distress.   Heart: Regular rate and rhythm, S1S2 present, or without murmur or extra heart sounds  Lung: clear to auscultation throughout lung fields, no wheezes, no rales, no rhonchi, and normal respiratory effort  Back: costovertebral angle tenderness absent  Abdomen: soft, nontender  Fundus: soft and non tender  Perineum: blood absent, amniotic fluid absent  Lower Extremities:  - Edema 1+  Membranes:  Intact  Fetal Heart Rate: Reactive    Prenatal Labs:   Lab Results   Component Value Date/Time    ABO/Rh(D) A Positive 12/21/2021 09:00 PM         Assessment/Plan:     Active Problems:    Pregnancy (3/7/2023)     34 yo at 20 2/7 weeks gestation c/o gush of fluid  PNC with Dr.Love M  Hx of cervical cerclage for cervical incompetence this pregnancy    Plan: Admit for evaluation  Perform ROM+  Observation overnight  TVUS for MANDO and fetal presentation

## 2023-03-08 NOTE — PROGRESS NOTES
Yoel Jones NOTE    Dr. Cody Ching called me about this patient. I did not speak directly to the patient. I have reviewed the medical records. The patient is a 32 yo  at 20w3d with several midtrimester losses. She had a history-indicated cerclage placed during this pregnancy. She had confirmed rupture of membranes yesterday. I recommend latency antibiotics for premature rupture of membranes. Сергей Amos is currently previable. I recommend continued inpatient management until delivery. Women with PPROM at at significant risk for sepsis, placental abruption, and  delivery. If any of these events happened when the patient is at home, she would be at significant risk for maternal mortality and morbidity. [facility] offer  res[facility] at 22 weeks 0 days gestation if the patient desires a trial of life at 25 weeks gestation, we can assist with the transfer.     Hannah Lau MD  Maternal Fetal Medicine

## 2023-03-08 NOTE — PROGRESS NOTES
0700: Received care of Ms. Jyotsna Villagomez resting in bed on left side and awake. No acute distress noted. Patient's boyfriend remains at bedside. Bedside report received from CHRISTINA Chang RN.     4794: Shift assessment initiated and completed. Temp 98.3. Patient denied pain and denied bleeding. L/R infusing at 200ml/hr via the pump. No infiltration noted. Dominique-pad in place with a scant amount of dark red blood present. Patient stated that she had the same dominique-pad in place since last night. 26: Patient ambulated to the bathroom to void 500ml of clear yellow colored urine in the speci-pan without difficulties. 0848: 1000ml L/R added and infusing at 200ml/hr via the pump. No infiltration noted. 0900: Rebekah Márquez RN received verbal orders from Dr. Anthony Mao. 0935: Dr. Amy Solano entered orders in the computer. The writer received a verbal order from Dr. Amy Solano as followed: admit the patient. 0306: Temp 98.6. Dr. Amy Solano in the patient's room and discussing the POC. 1009: SCD's applied bilaterally. Patient placed in trendelenburg position. 1020: Ampicillin 2 grams hung IVP via the pump. No infiltration noted. 1024: Dr. Amy Solano aware of the patient's c/o abdominal cramping off/on and rates discomfort at a \"4\". The writer also informed Dr. Amy Solano of the [facility] and notified of the variable decel noted on the OB strip earlier. The writer received a verbal order from Dr. Amy Solano to place the on toco only for 1 hour and continue to monitor the FHR every 6 hours using the doppler. 1025: Tylenol 650mg given po for abdominal cramping. 1027: EFM with external toco only applied. 1038: Prenatal Vitamin tablet 1 given po as ordered. 1110: The writer received a verbal order that the patient could be up to the bedside commode. 1130: Patient denied pain at this time. Zithromax 500mg hung IVP via the pump. No infiltration noted.      1200: Dr. Amy Solano placed a call to MOO Veliz, in r/t the patient's condition and the patient's POC thus far. See Dr. Michelle Goldsmith and Dr. Luis Angel Mendosa notes for findings/discussions. 1400: Temp 98.3 and denied pain. FHR noted to be 166-169 low midline quad. 1415: 1000ml L/R added and infusing at 200ml/hr via the pump. No infiltration noted. 1600: Temp 99.2 and denied pain. 1625: Ampicillin 2 grams hung via the pump. No infiltration noted. 1812: Temp 99.0 and denied pain. 1835: Dr. Balbir Ku in the patient's room. Vaginal swab obtained per the MD and sent to the lab for Trichomonas to be performed. 0710: Bedside report given to STEPH Wagoner RN.

## 2023-03-09 ENCOUNTER — TELEPHONE (OUTPATIENT)
Dept: OBGYN CLINIC | Age: 33
End: 2023-03-09

## 2023-03-09 LAB
BACTERIA SPEC CULT: ABNORMAL
C TRACH DNA SPEC QL NAA+PROBE: NEGATIVE
N GONORRHOEA DNA SPEC QL NAA+PROBE: NEGATIVE
SAMPLE TYPE: NORMAL
SERVICE CMNT-IMP: NORMAL
SPECIAL REQUESTS,SREQ: ABNORMAL
SPECIMEN SOURCE: NORMAL

## 2023-03-09 PROCEDURE — 74011250636 HC RX REV CODE- 250/636: Performed by: OBSTETRICS & GYNECOLOGY

## 2023-03-09 PROCEDURE — 74011250637 HC RX REV CODE- 250/637: Performed by: OBSTETRICS & GYNECOLOGY

## 2023-03-09 PROCEDURE — 74011000258 HC RX REV CODE- 258: Performed by: OBSTETRICS & GYNECOLOGY

## 2023-03-09 PROCEDURE — 65410000002 HC RM PRIVATE OB

## 2023-03-09 RX ADMIN — SODIUM CHLORIDE, POTASSIUM CHLORIDE, SODIUM LACTATE AND CALCIUM CHLORIDE 200 ML/HR: 600; 310; 30; 20 INJECTION, SOLUTION INTRAVENOUS at 08:21

## 2023-03-09 RX ADMIN — AMPICILLIN SODIUM 2 G: 2 INJECTION, POWDER, FOR SOLUTION INTRAMUSCULAR; INTRAVENOUS at 06:41

## 2023-03-09 RX ADMIN — AMPICILLIN SODIUM 2 G: 2 INJECTION, POWDER, FOR SOLUTION INTRAMUSCULAR; INTRAVENOUS at 16:30

## 2023-03-09 RX ADMIN — DOCUSATE SODIUM 100 MG: 100 CAPSULE, LIQUID FILLED ORAL at 21:59

## 2023-03-09 RX ADMIN — SODIUM CHLORIDE, POTASSIUM CHLORIDE, SODIUM LACTATE AND CALCIUM CHLORIDE 200 ML/HR: 600; 310; 30; 20 INJECTION, SOLUTION INTRAVENOUS at 14:59

## 2023-03-09 RX ADMIN — ACETAMINOPHEN 650 MG: 325 TABLET ORAL at 09:16

## 2023-03-09 RX ADMIN — SODIUM CHLORIDE, POTASSIUM CHLORIDE, SODIUM LACTATE AND CALCIUM CHLORIDE 200 ML/HR: 600; 310; 30; 20 INJECTION, SOLUTION INTRAVENOUS at 01:41

## 2023-03-09 RX ADMIN — SODIUM CHLORIDE, POTASSIUM CHLORIDE, SODIUM LACTATE AND CALCIUM CHLORIDE 200 ML/HR: 600; 310; 30; 20 INJECTION, SOLUTION INTRAVENOUS at 20:38

## 2023-03-09 RX ADMIN — PRENATAL VIT W/ FE FUMARATE-FA TAB 27-0.8 MG 1 TABLET: 27-0.8 TAB at 09:16

## 2023-03-09 RX ADMIN — AZITHROMYCIN DIHYDRATE 500 MG: 500 INJECTION, POWDER, LYOPHILIZED, FOR SOLUTION INTRAVENOUS at 10:01

## 2023-03-09 RX ADMIN — AMPICILLIN SODIUM 2 G: 2 INJECTION, POWDER, FOR SOLUTION INTRAMUSCULAR; INTRAVENOUS at 11:20

## 2023-03-09 RX ADMIN — AMPICILLIN SODIUM 2 G: 2 INJECTION, POWDER, FOR SOLUTION INTRAMUSCULAR; INTRAVENOUS at 22:03

## 2023-03-09 NOTE — PROGRESS NOTES
4437 Patient resting comfortably in bed. No complaints at this time. 5429 Patient up to bedside commode. Small dessert plate sized [place] of blood noted on pad, with bright red blood  when wiping. 500cc clear yellow urine voided. 1005 FHT easily found suprapubic with FHR of 156-166. Fetal movement witnessed by myself and patient. 1203 Patient up to bedside commode. Voided 800cc blood tinged urine. Small amount of bright red blood noted on pad.    1402 Patient up to bedside commode. Voided 600 cc clear yellow urine. Three dime sized places of light pink tinged drainage noted on pad. Dr. Augusto Phillips called. Patient to received next [place] shot on 3/14.  Patient's mother to bring dose to hospital

## 2023-03-09 NOTE — PROGRESS NOTES
1910: Bedside shift report received from [address] [place] [address]. Pt alert in trendelenburg position in bed and uncomplaining at this time. LR infusing via Iv access to left hand at 200ml/hr. 1920: Pt assisted up to use bedside commode, she voided 700 ml clear yellow urine without difficulty. NO bleeding noted when she wiped. 2007: FHR with doppler 160-170 bpm. Pt report fetal movement    2212: Pt reports abdominal cramping rated pain at 4/10. Dr. Jorge Mclaughlin at nurses station and updated, For [facility] monitoring for about an hour. 2337: [place] d/c no contractions noted. MD reviewed strip as well. 0157: -165bpm with doppler  0406: Pt assisted up to void on bedside commode. Voided freely 700 ml clear yellow urine. Small to moderate amount of blood on dale pad about 50ml, and blood noted when she wiped. Pad changed. 0448: Dr. Larisa Gonzalez at nurses station and updated about pt.  3372: Dr. Larisa Gonzalez at bedside, blood ting noted on pad. Speculum exam done cervical dilated about a finger tip. Old blood noted. Dominique pad changed. Pt updated by MD and advised to report cramping or pain. Pt verbalized understanding and had no questions or concerns. FHR with doppler 140-143 bpm.     0648: Pt got up to void, pad change 2 small spots noted. 0007: Shift report given to STEPH Koch

## 2023-03-09 NOTE — PROGRESS NOTES
High Risk Obstetrics Progress Note    Name: Hari Montes De Oca MRN: [id number]  SSN: [id number]    YOB: 1990  Age: 33 y.o. Sex: female      Subjective:     LOS: 2 days  Estimated Date of Delivery: 23   Gestational Age Today: 22w2d     Patient admitted for PPROM at [address] on 3/7/23. S: +FM. Denies LOF. Only vaginal bleeding she noted was when she got up to void earlier. Denies ctxs. Objective:     Vitals:  Blood pressure 127/75, pulse 96, temperature 97.5 °F (36.4 °C), resp. rate 16, height 5' 7\" (1.702 m), weight 270 lb (122.5 kg), last menstrual period 2022, SpO2 100 %, not currently breastfeeding. Temp (24hrs), Av.6 °F (37 °C), Min:97.5 °F (36.4 °C), Max:99.2 °F (50.8 °C)    Systolic (82ONL), OSM:803 , Min:93 , HSP:737      Diastolic (68VBI), TGN:82, Min:27, Max:75       Physical Exam:  Physical Exam  Constitutional:       General: She is awake. She is not in acute distress. Appearance: Normal appearance. She is obese. She is not ill-appearing. HENT:      Head: Normocephalic and atraumatic. Eyes:      Extraocular Movements: Extraocular movements intact. Pulmonary:      Effort: Pulmonary effort is normal.   Abdominal:      Comments: Gravid, NT   Musculoskeletal:      Cervical back: Normal range of motion. Right lower leg: No edema. Left lower leg: No edema. Neurological:      Mental Status: She is alert and oriented to person, place, and time. Skin:     General: Skin is warm and dry. Psychiatric:         Mood and Affect: Mood normal.         Behavior: Behavior normal. Behavior is cooperative.         FHR: 140s bpm    Al that was previously changed had approximately 50ccs of red blood    Speculum: cerclage suture intact, not under tension, FT appearance to os, 1cc of dark red mucus like blood in vault        Labs:   Recent Results (from the past 36 hour(s))   RUPTURE OF FETAL MEMBRANE    Collection Time: 23  6:40 PM   Result Value Ref Range Rupture of fetal membrane Positive (A) Negative     URINALYSIS W/MICROSCOPIC    Collection Time: 03/07/23  8:00 PM   Result Value Ref Range    Color Yellow/Straw      Appearance Clear Clear      Specific gravity 1.018 1.003 - 1.030      pH (UA) 6.0 5.0 - 8.0      Protein Negative Negative mg/dL    Glucose Negative Negative mg/dL    Ketone Negative Negative mg/dL    Bilirubin Negative Negative      Blood Moderate (A) Negative      Urobilinogen 0.1 0.1 - 1.0 EU/dL    Nitrites Negative Negative      Leukocyte Esterase Negative Negative      WBC 0-4 0 - 4 /hpf    RBC  0 - 5 /hpf    Bacteria Negative Negative /hpf   CBC WITH AUTOMATED DIFF    Collection Time: 03/08/23  9:21 AM   Result Value Ref Range    WBC 13.3 (H) 3.6 - 11.0 K/uL    RBC 3.59 (L) 3.80 - 5.20 M/uL    HGB 9.7 (L) 11.5 - 16.0 g/dL    HCT 29.5 (L) 35.0 - 47.0 %    MCV 82.2 80.0 - 99.0 FL    MCH 27.0 26.0 - 34.0 PG    MCHC 32.9 30.0 - 36.5 g/dL    RDW 14.7 (H) 11.5 - 14.5 %    PLATELET 725 586 - 070 K/uL    MPV 10.9 8.9 - 12.9 FL    NRBC 0.0 0.0  WBC    ABSOLUTE NRBC 0.00 0.00 - 0.01 K/uL    NEUTROPHILS 75 32 - 75 %    LYMPHOCYTES 17 12 - 49 %    MONOCYTES 6 5 - 13 %    EOSINOPHILS 1 0 - 7 %    BASOPHILS 0 0 - 1 %    IMMATURE GRANULOCYTES 1 (H) 0 - 0.5 %    ABS. NEUTROPHILS 10.1 (H) 1.8 - 8.0 K/UL    ABS. LYMPHOCYTES 2.2 0.8 - 3.5 K/UL    ABS. MONOCYTES 0.8 0.0 - 1.0 K/UL    ABS. EOSINOPHILS 0.1 0.0 - 0.4 K/UL    ABS. BASOPHILS 0.0 0.0 - 0.1 K/UL    ABS. IMM. GRANS. 0.1 (H) 0.00 - 0.04 K/UL    DF AUTOMATED     TRICHOMONAS RAPID AG    Collection Time: 03/08/23  6:35 PM   Result Value Ref Range    Trichomonas, rapid Ag Negative Negative         INDICATION: possible ROM     COMPARISON: Ultrasound 1/5/2023 and 12/5/2022     EXAM: Real-time transabdominal pelvic ultrasound of the gravid uterus. 3/7/23     FINDINGS: A single intrauterine pregnancy is shown. There is substantial  oligohydramnios without definable fluid. Measurement.  MANDO is estimated at 0.0  cm. Cervical length is 4 cm. The placenta is anterior in location and shows  uniform echotexture. Fetal position is vertex with fetal cardiac activity  measuring 165 bpm. Estimated gestational age by measurement of biparietal  diameter, head circumference, abdominal circumference and femur length ranges  from 18 weeks 5 days +/- 2 weeks 1 day with abdominal circumference to 19 weeks,  3 days and 19 weeks, 6 days with measurement of femur length head circumference,  and 20 weeks, 3 days with measurement of biparietal diameter. Ovaries are not  identified, obscured by bowel gas. IMPRESSION  Viable intrauterine pregnancy with oligohydramnios. Assessment and Plan:      Principal Problem:     premature rupture of membranes (9/10/2020)    Active Problems:    Pregnancy (3/7/2023)      20 weeks gestation of pregnancy (3/8/2023)      Cervical cerclage suture present (3/8/2023)      Short cervix (3/8/2023)      PROM (premature rupture of membranes) (3/8/2023)     -Reviewed warning signs for which patient is to call us. Otherwise, continue inpatient care with aforementioned plan- see my last note. -FHRs q 6 hrs  -OOB to void. -SCDs. -Regular diet.

## 2023-03-09 NOTE — TELEPHONE ENCOUNTER
Returned a call to [place] and advised her the patient has already received her first shipment of medication from [place].

## 2023-03-09 NOTE — TELEPHONE ENCOUNTER
Returned the patient's call and she is questioning if she needs to continue the [place] injections. Dr Ashly Lovelace consulted with Dr Christie Darby at [place] [address] who advises that the patient continue the injections. Spoke with the patient and advised her she needs to continue the injections and she states she will have her mother bring the injections to the hospital.  She is aware she should receive an injection every Tuesday.

## 2023-03-09 NOTE — PROGRESS NOTES
Problem: Knowledge Deficit  Goal: *Participate in the learning process  Outcome: Progressing Towards Goal     Problem: Knowledge Deficit  Goal: *Verbalize description of procedure  Outcome: Progressing Towards Goal     Problem: Knowledge Deficit  Goal: *Verbalizes understanding and describes medication purposes and frequencies  Outcome: Progressing Towards Goal     Problem: Knowledge Deficit  Goal: *Knowledge of discharge instructions  Outcome: Progressing Towards Goal     Problem: Knowledge Deficit  Goal: Interventions  Outcome: Progressing Towards Goal     Problem: Patient Education: Go to Patient Education Activity  Goal: Patient/Family Education  Outcome: Progressing Towards Goal     Problem: General Infection Care Plan (Adult and Pediatric)  Goal: Improvement in signs and symptoms of infection  Outcome: Progressing Towards Goal     Problem: Patient Education: Go to Patient Education Activity  Goal: Patient/Family Education  Outcome: Progressing Towards Goal     Problem: Premature Rupture of Membranes (PROM)  Goal: *Prevention of infection  Outcome: Progressing Towards Goal     Problem: Premature Rupture of Membranes (PROM)  Goal: *Reassuring fetal surveillance  Outcome: Progressing Towards Goal     Problem: Premature Rupture of Membranes (PROM)  Goal: *Maintain pregnancy as long as possible  Outcome: Progressing Towards Goal     Problem: Patient Education: Go to Patient Education Activity  Goal: Patient/Family Education  Outcome: Progressing Towards Goal

## 2023-03-10 PROCEDURE — 74011250636 HC RX REV CODE- 250/636: Performed by: OBSTETRICS & GYNECOLOGY

## 2023-03-10 PROCEDURE — 74011250637 HC RX REV CODE- 250/637: Performed by: OBSTETRICS & GYNECOLOGY

## 2023-03-10 PROCEDURE — 74011000258 HC RX REV CODE- 258: Performed by: OBSTETRICS & GYNECOLOGY

## 2023-03-10 PROCEDURE — 99231 SBSQ HOSP IP/OBS SF/LOW 25: CPT | Performed by: OBSTETRICS & GYNECOLOGY

## 2023-03-10 PROCEDURE — 65410000002 HC RM PRIVATE OB

## 2023-03-10 RX ORDER — ERYTHROMYCIN 250 MG/1
250 TABLET, DELAYED RELEASE ORAL 4 TIMES DAILY
Status: DISCONTINUED | OUTPATIENT
Start: 2023-03-10 | End: 2023-03-10

## 2023-03-10 RX ORDER — ERYTHROMYCIN ETHYLSUCCINATE 400 MG/1
400 TABLET ORAL EVERY 8 HOURS
Status: DISCONTINUED | OUTPATIENT
Start: 2023-03-10 | End: 2023-03-17 | Stop reason: HOSPADM

## 2023-03-10 RX ORDER — ERYTHROMYCIN ETHYLSUCCINATE 400 MG/1
400 TABLET ORAL EVERY 8 HOURS
Status: DISCONTINUED | OUTPATIENT
Start: 2023-03-10 | End: 2023-03-10

## 2023-03-10 RX ORDER — AMOXICILLIN 250 MG/1
250 CAPSULE ORAL EVERY 8 HOURS
Status: DISCONTINUED | OUTPATIENT
Start: 2023-03-10 | End: 2023-03-17 | Stop reason: HOSPADM

## 2023-03-10 RX ADMIN — PRENATAL VIT W/ FE FUMARATE-FA TAB 27-0.8 MG 1 TABLET: 27-0.8 TAB at 09:45

## 2023-03-10 RX ADMIN — SODIUM CHLORIDE, POTASSIUM CHLORIDE, SODIUM LACTATE AND CALCIUM CHLORIDE 200 ML/HR: 600; 310; 30; 20 INJECTION, SOLUTION INTRAVENOUS at 02:13

## 2023-03-10 RX ADMIN — SODIUM CHLORIDE, POTASSIUM CHLORIDE, SODIUM LACTATE AND CALCIUM CHLORIDE 200 ML/HR: 600; 310; 30; 20 INJECTION, SOLUTION INTRAVENOUS at 07:44

## 2023-03-10 RX ADMIN — AMOXICILLIN 250 MG: 250 CAPSULE ORAL at 21:43

## 2023-03-10 RX ADMIN — AMPICILLIN SODIUM 2 G: 2 INJECTION, POWDER, FOR SOLUTION INTRAMUSCULAR; INTRAVENOUS at 04:10

## 2023-03-10 RX ADMIN — ERYTHROMYCIN ETHYLSUCCINATE 400 MG: 400 TABLET ORAL at 21:43

## 2023-03-10 RX ADMIN — ERYTHROMYCIN ETHYLSUCCINATE 400 MG: 400 TABLET ORAL at 09:45

## 2023-03-10 RX ADMIN — ERYTHROMYCIN ETHYLSUCCINATE 400 MG: 400 TABLET ORAL at 13:00

## 2023-03-10 RX ADMIN — AMOXICILLIN 250 MG: 250 CAPSULE ORAL at 09:45

## 2023-03-10 RX ADMIN — AMOXICILLIN 250 MG: 250 CAPSULE ORAL at 14:00

## 2023-03-10 NOTE — PROGRESS NOTES
1910: Bedside shift report received from [address]. Pt alert in bed in trendelenburg position, she is pleasant and uncomplaining, IVF (LR) infusing vis access to left hand. Her parents present with her. 2158: -171bpm. Pt reports FM. Pt's mother assisted her with using bedside commode a few minutes earlier, pt voided freely and reports spotting when she wipes. She denies pain or cramping. 0015: Pt resting in bed, mother present. 0541: FHR with doppler 150s.     0720: Bedside shift report given to LEIDA Saucedo Rn

## 2023-03-10 NOTE — PROGRESS NOTES
AntePartum High Risk Pregnancy Note    [facility] Day: 3    Patient admitted for  premature rupture of membranes 20w5d Estimated Date of Delivery: 23 states she does not  have  abdominal pain  , contractions, vaginal bleeding , and swelling. Vitals:  Patient Vitals for the past 24 hrs:   BP Temp Pulse Resp SpO2   03/10/23 0815 122/63 -- -- -- --   03/10/23 0800 122/63 98 °F (36.7 °C) 95 18 --   03/10/23 0412 120/68 97.9 °F (36.6 °C) 84 16 --   03/10/23 0411 -- -- -- -- 100 %   03/10/23 0213 -- 97.8 °F (36.6 °C) -- -- --   23 2158 134/81 98.8 °F (37.1 °C) 89 14 100 %   23 1804 -- 98.7 °F (37.1 °C) -- -- --   23 1634 (!) 146/66 98.2 °F (36.8 °C) 92 18 --   23 1404 -- 97.9 °F (36.6 °C) -- -- --   23 1204 -- 98.8 °F (37.1 °C) -- -- --   23 1006 119/76 98.6 °F (37 °C) (!) 109 18 --     Temp (24hrs), Av.3 °F (36.8 °C), Min:97.8 °F (36.6 °C), Max:98.8 °F (37.1 °C)    I&O:   03/10 0701 - 03/10 1900  In: -   Out: 600 [Urine:600]             1901 - 03/10 0700  In: -   Out: 6940 [Urine:6350]    Exam:  Patient without distress. Abdomen soft, non-tender               Fundus soft and non tender               Right upper quadrant non-tender               Perineum No sign of blood or amniotic fluid               Lower extremities edema No               Uterine Activity: None               NST:  Reactive           Labs: No results found for this or any previous visit (from the past 24 hour(s)).     Assessment and Plan:    Patient Active Problem List    Diagnosis Date Noted    20 weeks gestation of pregnancy 2023    Cervical cerclage suture present 2023    Short cervix 2023    PROM (premature rupture of membranes) 2023    Pregnancy 2023    Cervical incompetence 2021     premature rupture of membranes 09/10/2020     A/P 20 5/7 weeks gestation  PPROM     Premature Rupture of the Membranes:  Continues Tocodynamometer and fetal heart rate monitoring for first 24 hours  Intravenous antibiotics for 48 hours  followed by po antibiotics for 5 more days  Change to IV Abx

## 2023-03-10 NOTE — PROGRESS NOTES
0715-BEDSIDE REPORT RECEIVED FROM PREVIOUS SHIFT, WRITER ASSUMES CARE OF PT.     1300-ORDER RECEIVED THAT IV MAYBE CONVERTED TO IV LOCK AT THIS TIME

## 2023-03-10 NOTE — PROGRESS NOTES
Problem: Knowledge Deficit  Goal: *Participate in the learning process  Outcome: Progressing Towards Goal  Goal: *Verbalize description of procedure  Outcome: Progressing Towards Goal  Goal: *Verbalizes understanding and describes medication purposes and frequencies  Outcome: Progressing Towards Goal  Goal: *Knowledge of discharge instructions  Outcome: Progressing Towards Goal  Goal: Interventions  Outcome: Progressing Towards Goal     Problem: Patient Education: Go to Patient Education Activity  Goal: Patient/Family Education  Outcome: Progressing Towards Goal     Problem: General Infection Care Plan (Adult and Pediatric)  Goal: Improvement in signs and symptoms of infection  Outcome: Progressing Towards Goal     Problem: Patient Education: Go to Patient Education Activity  Goal: Patient/Family Education  Outcome: Progressing Towards Goal     Problem: Premature Rupture of Membranes (PROM)  Goal: *Prevention of infection  Outcome: Progressing Towards Goal  Goal: *Reassuring fetal surveillance  Outcome: Progressing Towards Goal  Goal: *Maintain pregnancy as long as possible  Outcome: Progressing Towards Goal     Problem: Patient Education: Go to Patient Education Activity  Goal: Patient/Family Education  Outcome: Progressing Towards Goal     Problem: Pressure Injury - Risk of  Goal: *Prevention of pressure injury  Description: Document Long Scale and appropriate interventions in the flowsheet. Outcome: Progressing Towards Goal  Note: Pressure Injury Interventions:             Activity Interventions:  (Advice pt in repositioning frequently)    Mobility Interventions:  (Advice pt to repostion frequently)                          Problem: Patient Education: Go to Patient Education Activity  Goal: Patient/Family Education  Outcome: Progressing Towards Goal

## 2023-03-11 LAB
BASOPHILS # BLD: 0 K/UL (ref 0–0.1)
BASOPHILS NFR BLD: 0 % (ref 0–1)
DIFFERENTIAL METHOD BLD: ABNORMAL
EOSINOPHIL # BLD: 0.1 K/UL (ref 0–0.4)
EOSINOPHIL NFR BLD: 1 % (ref 0–7)
ERYTHROCYTE [DISTWIDTH] IN BLOOD BY AUTOMATED COUNT: 14.5 % (ref 11.5–14.5)
HCT VFR BLD AUTO: 30.3 % (ref 35–47)
HGB BLD-MCNC: 10.1 G/DL (ref 11.5–16)
IMM GRANULOCYTES # BLD AUTO: 0.1 K/UL (ref 0–0.04)
IMM GRANULOCYTES NFR BLD AUTO: 1 % (ref 0–0.5)
LYMPHOCYTES # BLD: 2.4 K/UL (ref 0.8–3.5)
LYMPHOCYTES NFR BLD: 20 % (ref 12–49)
MCH RBC QN AUTO: 27.2 PG (ref 26–34)
MCHC RBC AUTO-ENTMCNC: 33.3 G/DL (ref 30–36.5)
MCV RBC AUTO: 81.5 FL (ref 80–99)
MONOCYTES # BLD: 0.6 K/UL (ref 0–1)
MONOCYTES NFR BLD: 5 % (ref 5–13)
NEUTS SEG # BLD: 8.8 K/UL (ref 1.8–8)
NEUTS SEG NFR BLD: 73 % (ref 32–75)
NRBC # BLD: 0 K/UL (ref 0–0.01)
NRBC BLD-RTO: 0 PER 100 WBC
PLATELET # BLD AUTO: 241 K/UL (ref 150–400)
PMV BLD AUTO: 10.5 FL (ref 8.9–12.9)
RBC # BLD AUTO: 3.72 M/UL (ref 3.8–5.2)
WBC # BLD AUTO: 12 K/UL (ref 3.6–11)

## 2023-03-11 PROCEDURE — 99231 SBSQ HOSP IP/OBS SF/LOW 25: CPT | Performed by: OBSTETRICS & GYNECOLOGY

## 2023-03-11 PROCEDURE — 74011250637 HC RX REV CODE- 250/637: Performed by: OBSTETRICS & GYNECOLOGY

## 2023-03-11 PROCEDURE — 85025 COMPLETE CBC W/AUTO DIFF WBC: CPT

## 2023-03-11 PROCEDURE — 36415 COLL VENOUS BLD VENIPUNCTURE: CPT

## 2023-03-11 PROCEDURE — 65410000002 HC RM PRIVATE OB

## 2023-03-11 RX ADMIN — ERYTHROMYCIN ETHYLSUCCINATE 400 MG: 400 TABLET ORAL at 05:32

## 2023-03-11 RX ADMIN — ERYTHROMYCIN ETHYLSUCCINATE 400 MG: 400 TABLET ORAL at 22:03

## 2023-03-11 RX ADMIN — AMOXICILLIN 250 MG: 250 CAPSULE ORAL at 13:45

## 2023-03-11 RX ADMIN — AMOXICILLIN 250 MG: 250 CAPSULE ORAL at 22:00

## 2023-03-11 RX ADMIN — AMOXICILLIN 250 MG: 250 CAPSULE ORAL at 05:32

## 2023-03-11 RX ADMIN — ERYTHROMYCIN ETHYLSUCCINATE 400 MG: 400 TABLET ORAL at 13:45

## 2023-03-11 RX ADMIN — DOCUSATE SODIUM 100 MG: 100 CAPSULE, LIQUID FILLED ORAL at 22:03

## 2023-03-11 RX ADMIN — PRENATAL VIT W/ FE FUMARATE-FA TAB 27-0.8 MG 1 TABLET: 27-0.8 TAB at 08:45

## 2023-03-11 NOTE — PROGRESS NOTES
1900-assumed care of pt. Pt resting in bed, trendelenburg. Pt mother at bedside for support. Pt aware of plan of care for the night. Pt has no complaints at this time. 2145-doppler fht 151-160.  Pt has no complaints at this time

## 2023-03-11 NOTE — PROGRESS NOTES
0700-BEDSIDE REPORT RECEIVED FROM PREVIOUS SHIFT,  WRITER ASSUMES CARE OF PT.     0800-MORNING ASSESSMENT PERFORMED, PT. RESTING WITH EYES CLOSED, NO COMPLAINTS VOICED AT THIS TIME

## 2023-03-11 NOTE — PROGRESS NOTES
[facility] High Risk Pregnancy Note    [address] Day: 4    Patient admitted for  premature rupture of membranes 20w6d Estimated Date of Delivery: 23 states she does not  have  contractions and vaginal bleeding . Vitals:  Patient Vitals for the past 24 hrs:   BP Temp Pulse Resp SpO2   03/10/23 1913 138/71 98.7 °F (37.1 °C) 93 -- 100 %   03/10/23 0815 122/63 -- -- -- --   03/10/23 0800 122/63 98 °F (36.7 °C) 95 18 --     Temp (24hrs), Av.4 °F (36.9 °C), Min:98 °F (36.7 °C), Max:98.7 °F (37.1 °C)    I&O:   03/10 1901 - 700  In: -   Out: 700 [Urine:700]              07 - 03/10 190  In: -   Out: 5200 [Urine:5200]    Exam:  Patient without distress. Abdomen soft, non-tender               Fundus soft and non tender               Right upper quadrant non-tender               Perineum No sign of blood or amniotic fluid               Lower extremities edema 1+              Station: Not Checked     Uterine Activity: None               NST:  Reactive           Labs: No results found for this or any previous visit (from the past 24 hour(s)).     Assessment and Plan:    Patient Active Problem List    Diagnosis Date Noted    20 weeks gestation of pregnancy 2023    Cervical cerclage suture present 2023    Short cervix 2023    PROM (premature rupture of membranes) 2023    Pregnancy 2023    Cervical incompetence 2021     premature rupture of membranes 09/10/2020      Premature Rupture of the Membranes:  Twice daily non stress tests after 24 hours  Intravenous antibiotics for 48 hours  followed by po antibiotics for 5 more days

## 2023-03-12 PROCEDURE — 74011250637 HC RX REV CODE- 250/637: Performed by: OBSTETRICS & GYNECOLOGY

## 2023-03-12 PROCEDURE — 65410000002 HC RM PRIVATE OB

## 2023-03-12 PROCEDURE — 99231 SBSQ HOSP IP/OBS SF/LOW 25: CPT | Performed by: OBSTETRICS & GYNECOLOGY

## 2023-03-12 RX ADMIN — DOCUSATE SODIUM 100 MG: 100 CAPSULE, LIQUID FILLED ORAL at 22:29

## 2023-03-12 RX ADMIN — AMOXICILLIN 250 MG: 250 CAPSULE ORAL at 14:00

## 2023-03-12 RX ADMIN — AMOXICILLIN 250 MG: 250 CAPSULE ORAL at 22:29

## 2023-03-12 RX ADMIN — AMOXICILLIN 250 MG: 250 CAPSULE ORAL at 06:45

## 2023-03-12 RX ADMIN — ERYTHROMYCIN ETHYLSUCCINATE 400 MG: 400 TABLET ORAL at 06:45

## 2023-03-12 RX ADMIN — ERYTHROMYCIN ETHYLSUCCINATE 400 MG: 400 TABLET ORAL at 14:00

## 2023-03-12 RX ADMIN — ERYTHROMYCIN ETHYLSUCCINATE 400 MG: 400 TABLET ORAL at 22:28

## 2023-03-12 RX ADMIN — PRENATAL VIT W/ FE FUMARATE-FA TAB 27-0.8 MG 1 TABLET: 27-0.8 TAB at 08:00

## 2023-03-12 NOTE — PROGRESS NOTES
0700-BEDSIDE REPORT RECEIVED FROM PREVIOUS SHIFT,WRITER ASSUMES CARE OF PT.    0800-IV LOCK IN LEFT HAND FLUSHED WITHOUT DIFFICULTY AND RE TAPED AT THIS TIME.  MORNING ASSESSMENT PERFORMED

## 2023-03-12 NOTE — PROGRESS NOTES
[facility] High Risk Pregnancy Note    [address] Day: 5    Patient admitted for  premature rupture of membranes 21w0d Estimated Date of Delivery: 23 states she does not  have  abdominal pain  , contractions, right upper quadrant pain  , and vaginal bleeding . Vitals:  Patient Vitals for the past 24 hrs:   BP Temp Pulse Resp SpO2   23 (!) 141/73 98.7 °F (37.1 °C) 95 18 100 %   23 -- -- -- -- 100 %   23 1345 129/65 98.2 °F (36.8 °C) -- 16 --   23 0800 120/64 97.7 °F (36.5 °C) 86 18 --     Temp (24hrs), Av.2 °F (36.8 °C), Min:97.7 °F (36.5 °C), Max:98.7 °F (37.1 °C)    I&O:   1901 -  07  In: -   Out: 500 [Urine:500]             03/10 07 -  190  In: -   Out: 3500 [Urine:3500]    Exam:  Patient without distress. Abdomen soft, non-tender               Fundus soft and non tender               Right upper quadrant non-tender               Perineum No sign of blood or amniotic fluid               Lower extremities edema 1+     Uterine Activity: None               NST:  Reactive           Labs:   Recent Results (from the past 24 hour(s))   CBC WITH AUTOMATED DIFF    Collection Time: 23 12:05 PM   Result Value Ref Range    WBC 12.0 (H) 3.6 - 11.0 K/uL    RBC 3.72 (L) 3.80 - 5.20 M/uL    HGB 10.1 (L) 11.5 - 16.0 g/dL    HCT 30.3 (L) 35.0 - 47.0 %    MCV 81.5 80.0 - 99.0 FL    MCH 27.2 26.0 - 34.0 PG    MCHC 33.3 30.0 - 36.5 g/dL    RDW 14.5 11.5 - 14.5 %    PLATELET 756 795 - 384 K/uL    MPV 10.5 8.9 - 12.9 FL    NRBC 0.0 0.0  WBC    ABSOLUTE NRBC 0.00 0.00 - 0.01 K/uL    NEUTROPHILS 73 32 - 75 %    LYMPHOCYTES 20 12 - 49 %    MONOCYTES 5 5 - 13 %    EOSINOPHILS 1 0 - 7 %    BASOPHILS 0 0 - 1 %    IMMATURE GRANULOCYTES 1 (H) 0 - 0.5 %    ABS. NEUTROPHILS 8.8 (H) 1.8 - 8.0 K/UL    ABS. LYMPHOCYTES 2.4 0.8 - 3.5 K/UL    ABS. MONOCYTES 0.6 0.0 - 1.0 K/UL    ABS.  EOSINOPHILS 0.1 0.0 - 0.4 K/UL    ABS. BASOPHILS 0.0 0.0 - 0.1 K/UL    ABS. IMM.  GRANS. 0.1 (H) 0.00 - 0.04 K/UL    DF AUTOMATED         Assessment and Plan:    Patient Active Problem List    Diagnosis Date Noted    20 weeks gestation of pregnancy 2023    Cervical cerclage suture present 2023    Short cervix 2023    PROM (premature rupture of membranes) 2023    Pregnancy 2023    Cervical incompetence 2021     premature rupture of membranes 09/10/2020      Premature Rupture of the Membranes:  Intravenous antibiotics for 48 hours  followed by po antibiotics for 5 more days  No evidence of chorioamnionitis  GBS+  Negative GC/Chl

## 2023-03-12 NOTE — PROGRESS NOTES
1900-assumed care of pt. Pt resting in bed. No complaints at this time. Pt aware of plan of care for the night. 0620-pharmacy called about meds.  Tech to bring up     0650-pt up to bsc    0653-report given to am nurse

## 2023-03-13 PROCEDURE — 74011250637 HC RX REV CODE- 250/637: Performed by: OBSTETRICS & GYNECOLOGY

## 2023-03-13 PROCEDURE — 65410000002 HC RM PRIVATE OB

## 2023-03-13 RX ORDER — ADHESIVE BANDAGE
30 BANDAGE TOPICAL ONCE
Status: COMPLETED | OUTPATIENT
Start: 2023-03-13 | End: 2023-03-13

## 2023-03-13 RX ORDER — METHYLERGONOVINE MALEATE 0.2 MG/ML
INJECTION INTRAVENOUS
Status: DISPENSED
Start: 2023-03-13 | End: 2023-03-14

## 2023-03-13 RX ADMIN — AMOXICILLIN 250 MG: 250 CAPSULE ORAL at 14:56

## 2023-03-13 RX ADMIN — ERYTHROMYCIN ETHYLSUCCINATE 400 MG: 400 TABLET ORAL at 14:55

## 2023-03-13 RX ADMIN — MAGNESIUM HYDROXIDE 30 ML: 400 SUSPENSION ORAL at 20:56

## 2023-03-13 RX ADMIN — DOCUSATE SODIUM 100 MG: 100 CAPSULE, LIQUID FILLED ORAL at 21:00

## 2023-03-13 RX ADMIN — AMOXICILLIN 250 MG: 250 CAPSULE ORAL at 22:00

## 2023-03-13 RX ADMIN — ERYTHROMYCIN ETHYLSUCCINATE 400 MG: 400 TABLET ORAL at 20:55

## 2023-03-13 RX ADMIN — ERYTHROMYCIN ETHYLSUCCINATE 400 MG: 400 TABLET ORAL at 06:36

## 2023-03-13 RX ADMIN — AMOXICILLIN 250 MG: 250 CAPSULE ORAL at 06:00

## 2023-03-13 RX ADMIN — PRENATAL VIT W/ FE FUMARATE-FA TAB 27-0.8 MG 1 TABLET: 27-0.8 TAB at 14:55

## 2023-03-13 NOTE — PROGRESS NOTES
Progress Note    Patient: Andie Lawrence MRN: [id number]  SSN: [id number]    YOB: 1990  Age: 33 y.o. Sex: female      Admit Date: 3/7/2023    LOS: 5 days     Subjective:     No Complaints, still leaking fluid, no abdominal cramping noted    Objective:     Vitals:    23 1107 23 1315 23 1454 23 1455   BP: 133/64   (!) 131/59   Pulse: 91   86   Resp:       Temp: 98.1 °F (36.7 °C) 98 °F (36.7 °C)  98 °F (36.7 °C)   SpO2: 96%  100%    Weight:       Height:            Intake and Output:  Current Shift:  07 -  1900  In: -   Out: 500 [Urine:500]  Last three shifts:  1901 -  0700  In: -   Out: 3100 [Urine:3100]    Physical Exam:   Abd:Gravid, NT uterus  FHT's present    Lab/Data Review: All lab results for the last 24 hours reviewed. Assessment:     Principal Problem:     premature rupture of membranes (9/10/2020)    Active Problems:    Pregnancy (3/7/2023)      20 weeks gestation of pregnancy (3/8/2023)      Cervical cerclage suture present (3/8/2023)      Short cervix (3/8/2023)      PROM (premature rupture of membranes) (3/8/2023)        Plan:     Routine orders  D/C IV  Cont.  PO meds  Rec; BMZ at 22 weeks  Plan is to transfer out once 22 weeks has been achieved    Signed By: Anson Joseph MD     2023

## 2023-03-14 LAB
BASOPHILS # BLD: 0 K/UL (ref 0–0.1)
BASOPHILS NFR BLD: 0 % (ref 0–1)
DIFFERENTIAL METHOD BLD: ABNORMAL
EOSINOPHIL # BLD: 0.1 K/UL (ref 0–0.4)
EOSINOPHIL NFR BLD: 1 % (ref 0–7)
ERYTHROCYTE [DISTWIDTH] IN BLOOD BY AUTOMATED COUNT: 14.2 % (ref 11.5–14.5)
HCT VFR BLD AUTO: 32.1 % (ref 35–47)
HGB BLD-MCNC: 10.6 G/DL (ref 11.5–16)
IMM GRANULOCYTES # BLD AUTO: 0.1 K/UL (ref 0–0.04)
IMM GRANULOCYTES NFR BLD AUTO: 1 % (ref 0–0.5)
LYMPHOCYTES # BLD: 2.9 K/UL (ref 0.8–3.5)
LYMPHOCYTES NFR BLD: 20 % (ref 12–49)
MCH RBC QN AUTO: 26.9 PG (ref 26–34)
MCHC RBC AUTO-ENTMCNC: 33 G/DL (ref 30–36.5)
MCV RBC AUTO: 81.5 FL (ref 80–99)
MONOCYTES # BLD: 0.9 K/UL (ref 0–1)
MONOCYTES NFR BLD: 6 % (ref 5–13)
NEUTS SEG # BLD: 10.5 K/UL (ref 1.8–8)
NEUTS SEG NFR BLD: 72 % (ref 32–75)
NRBC # BLD: 0 K/UL (ref 0–0.01)
NRBC BLD-RTO: 0 PER 100 WBC
PLATELET # BLD AUTO: 262 K/UL (ref 150–400)
PMV BLD AUTO: 10.5 FL (ref 8.9–12.9)
RBC # BLD AUTO: 3.94 M/UL (ref 3.8–5.2)
WBC # BLD AUTO: 14.5 K/UL (ref 3.6–11)

## 2023-03-14 PROCEDURE — 74011250637 HC RX REV CODE- 250/637: Performed by: OBSTETRICS & GYNECOLOGY

## 2023-03-14 PROCEDURE — 36415 COLL VENOUS BLD VENIPUNCTURE: CPT

## 2023-03-14 PROCEDURE — 65410000002 HC RM PRIVATE OB

## 2023-03-14 PROCEDURE — 85025 COMPLETE CBC W/AUTO DIFF WBC: CPT

## 2023-03-14 RX ORDER — FACIAL-BODY WIPES
10 EACH TOPICAL ONCE
Status: COMPLETED | OUTPATIENT
Start: 2023-03-14 | End: 2023-03-14

## 2023-03-14 RX ORDER — ADHESIVE BANDAGE
30 BANDAGE TOPICAL ONCE
Status: DISCONTINUED | OUTPATIENT
Start: 2023-03-14 | End: 2023-03-15 | Stop reason: ALTCHOICE

## 2023-03-14 RX ADMIN — ERYTHROMYCIN ETHYLSUCCINATE 400 MG: 400 TABLET ORAL at 06:31

## 2023-03-14 RX ADMIN — AMOXICILLIN 250 MG: 250 CAPSULE ORAL at 06:00

## 2023-03-14 RX ADMIN — BISACODYL 10 MG: 10 SUPPOSITORY RECTAL at 19:55

## 2023-03-14 RX ADMIN — AMOXICILLIN 250 MG: 250 CAPSULE ORAL at 22:20

## 2023-03-14 RX ADMIN — PRENATAL VIT W/ FE FUMARATE-FA TAB 27-0.8 MG 1 TABLET: 27-0.8 TAB at 09:54

## 2023-03-14 RX ADMIN — AMOXICILLIN 250 MG: 250 CAPSULE ORAL at 14:00

## 2023-03-14 RX ADMIN — ERYTHROMYCIN ETHYLSUCCINATE 400 MG: 400 TABLET ORAL at 22:20

## 2023-03-14 RX ADMIN — DOCUSATE SODIUM 100 MG: 100 CAPSULE, LIQUID FILLED ORAL at 19:55

## 2023-03-14 RX ADMIN — ERYTHROMYCIN ETHYLSUCCINATE 400 MG: 400 TABLET ORAL at 13:25

## 2023-03-14 NOTE — PROGRESS NOTES
Progress Note    Patient: Anitha Donahue MRN: [id number]  SSN: [id number]    YOB: 1990  Age: 33 y.o. Sex: female      Admit Date: 3/7/2023    LOS: 6 days     Subjective:     Patient is without complaints. She reports continued leaking of amniotic fluid. Objective:     Vitals:    23 1454 23 1455 23 1921 23 0635   BP:  (!) 131/59 129/69 116/67   Pulse:  86 96    Resp:   16    Temp:  98 °F (36.7 °C)     SpO2: 100%      Weight:       Height:            Physical Exam:   Exam is unchanged    Lab/Data Review:  No new lab    Assessment:     Principal Problem:     premature rupture of membranes (9/10/2020)    Active Problems:    Pregnancy (3/7/2023)      20 weeks gestation of pregnancy (3/8/2023)      Cervical cerclage suture present (3/8/2023)      Short cervix (3/8/2023)      PROM (premature rupture of membranes) (3/8/2023)     premature rupture of membranes at 2 1 weeks and 2 days, stable. Plan:     Continued observation with plan for transfer to [facility] on Friday. Continue p.o. antibiotics.     Signed By: Meghna Goodson MD     2023

## 2023-03-15 ENCOUNTER — APPOINTMENT (OUTPATIENT)
Dept: ULTRASOUND IMAGING | Age: 33
End: 2023-03-15
Attending: OBSTETRICS & GYNECOLOGY
Payer: MEDICAID

## 2023-03-15 PROCEDURE — 74011250637 HC RX REV CODE- 250/637: Performed by: OBSTETRICS & GYNECOLOGY

## 2023-03-15 PROCEDURE — 76815 OB US LIMITED FETUS(S): CPT

## 2023-03-15 PROCEDURE — 65410000002 HC RM PRIVATE OB

## 2023-03-15 RX ADMIN — PRENATAL VIT W/ FE FUMARATE-FA TAB 27-0.8 MG 1 TABLET: 27-0.8 TAB at 13:12

## 2023-03-15 RX ADMIN — ERYTHROMYCIN ETHYLSUCCINATE 400 MG: 400 TABLET ORAL at 22:06

## 2023-03-15 RX ADMIN — ERYTHROMYCIN ETHYLSUCCINATE 400 MG: 400 TABLET ORAL at 13:09

## 2023-03-15 RX ADMIN — ERYTHROMYCIN ETHYLSUCCINATE 400 MG: 400 TABLET ORAL at 07:12

## 2023-03-15 RX ADMIN — AMOXICILLIN 250 MG: 250 CAPSULE ORAL at 13:10

## 2023-03-15 RX ADMIN — DOCUSATE SODIUM 100 MG: 100 CAPSULE, LIQUID FILLED ORAL at 22:06

## 2023-03-15 RX ADMIN — AMOXICILLIN 250 MG: 250 CAPSULE ORAL at 22:06

## 2023-03-15 RX ADMIN — AMOXICILLIN 250 MG: 250 CAPSULE ORAL at 07:12

## 2023-03-15 RX ADMIN — ACETAMINOPHEN 650 MG: 325 TABLET ORAL at 17:45

## 2023-03-15 NOTE — PROGRESS NOTES
[facility] High Risk Pregnancy Note    Patient admitted for  premature rupture of membranes states she does have normal fetal movement and does not  have  headache , abdominal pain  , contractions, right upper quadrant pain  , vaginal bleeding , and swelling. LOS:  PPROM since 3/7  Vitals: Temp (24hrs), Av.1 °F (36.7 °C), Min:98 °F (36.7 °C), Max:98.2 °F (36.8 °C)   Patient Vitals for the past 24 hrs:   BP   23 2219 (!) 143/70   23 1321 137/74       I&O:   No intake/output data recorded. No intake/output data recorded. Exam:  Patient without distress. Abdomen: soft, non-tender               Fundus: soft and non tender                          Right Upper Quadrant: non-tender               Perineum: No sign of blood or amniotic fluid                    Fetal Monitoring:  Reassuring   Uterine Activity: None           Cerclage in place           Lab/Data Review: All lab results for the last 24 hours reviewed.     Assessment and Plan:      Principal Problem:     premature rupture of membranes (9/10/2020)    Active Problems:    Pregnancy (3/7/2023)      20 weeks gestation of pregnancy (3/8/2023)      Cervical cerclage suture present (3/8/2023)      Short cervix (3/8/2023)      PROM (premature rupture of membranes) (3/8/2023)           Premature Rupture of the Membranes:  Twice daily non stress tests after 24 hours  Plan delivery at 34 weeks  Having signs of labor, put on continuous Tocodynamometer and Fetal Heart Monitoring  Repeat CBC stat to evaluate for Chorioamnionitis

## 2023-03-16 PROCEDURE — 65410000002 HC RM PRIVATE OB

## 2023-03-16 PROCEDURE — 74011250637 HC RX REV CODE- 250/637: Performed by: OBSTETRICS & GYNECOLOGY

## 2023-03-16 RX ORDER — DOCUSATE SODIUM 100 MG/1
100 CAPSULE, LIQUID FILLED ORAL 2 TIMES DAILY
Status: DISCONTINUED | OUTPATIENT
Start: 2023-03-16 | End: 2023-03-17 | Stop reason: HOSPADM

## 2023-03-16 RX ADMIN — ERYTHROMYCIN ETHYLSUCCINATE 400 MG: 400 TABLET ORAL at 07:15

## 2023-03-16 RX ADMIN — AMOXICILLIN 250 MG: 250 CAPSULE ORAL at 14:35

## 2023-03-16 RX ADMIN — ERYTHROMYCIN ETHYLSUCCINATE 400 MG: 400 TABLET ORAL at 21:05

## 2023-03-16 RX ADMIN — ERYTHROMYCIN ETHYLSUCCINATE 400 MG: 400 TABLET ORAL at 14:35

## 2023-03-16 RX ADMIN — ACETAMINOPHEN 650 MG: 325 TABLET ORAL at 21:05

## 2023-03-16 RX ADMIN — AMOXICILLIN 250 MG: 250 CAPSULE ORAL at 07:15

## 2023-03-16 RX ADMIN — DOCUSATE SODIUM 100 MG: 100 CAPSULE, LIQUID FILLED ORAL at 09:15

## 2023-03-16 RX ADMIN — ACETAMINOPHEN 650 MG: 325 TABLET ORAL at 01:16

## 2023-03-16 RX ADMIN — DOCUSATE SODIUM 100 MG: 100 CAPSULE, LIQUID FILLED ORAL at 21:05

## 2023-03-16 RX ADMIN — AMOXICILLIN 250 MG: 250 CAPSULE ORAL at 21:05

## 2023-03-16 RX ADMIN — PRENATAL VIT W/ FE FUMARATE-FA TAB 27-0.8 MG 1 TABLET: 27-0.8 TAB at 09:15

## 2023-03-16 NOTE — PROGRESS NOTES
1905: Report received from Linda Benitez RN.  2000: RN at bedside for [facility]. FHT's in the 160's. Patient states she is feeling some \"stretching and pain that comes and goes near her vaginal area. \" Patient connected to [facility]. 2050: Dr. Sana Coronado made aware of stretching and pain that comes and goes near vaginal area and no ctx noted. No new orders received. 2100: No ctx noted on monitor nor palpated. Patient states she did not feel any pain or stretching while on the monitor. 0135: FHT's 155's. 0: Pharmacy messaged for patient's antibiotics. 2443: Pharmacy messaged back that the tech will bring up medication. 0099: Pharmacy called for update on medication- pharmacy did not answer. 1: Pharmacy messaged for update on where is tech, no response yet. 1491: Report given to JOSEPHINE Brenner

## 2023-03-16 NOTE — PROGRESS NOTES
Progress Note    Patient: Baljit Patel MRN: [id number]  SSN: [id number]    YOB: 1990  Age: 33 y.o. Sex: female      Admit Date: 3/7/2023    LOS: 8 days     Subjective:     No Complaints    Objective:     Vitals:    03/15/23 1308 03/15/23 2000 03/16/23 0109 23 0710   BP: 124/64 134/77 127/66 108/64   Pulse: 84 96 86    Resp:   16 16   Temp: 97.7 °F (36.5 °C) 98.2 °F (36.8 °C) 98.1 °F (36.7 °C) 98.2 °F (36.8 °C)   SpO2: 98% 100%     Weight:       Height:            Intake and Output:  Current Shift: No intake/output data recorded. Last three shifts:  1901 -  0700  In: -   Out: 5959 [Urine:1050]    Physical Exam:   Abd:Gravid, NST reactive  No UC's      Lab/Data Review: All lab results for the last 24 hours reviewed. No results found for this or any previous visit (from the past 24 hour(s)). US Results (most recent):  Results from [place] encounter on 23    US [employer]    Narrative  EXAM:  US PREG [employer]TS LTD    INDICATION: Amniotic fluid index and positioning    COMPARISON: 2023. TECHNIQUE: Limited transabdominal pelvic sonography. FINDINGS: Single live intrauterine gestation in cephalic presentation. Heart  rate is 139 bpm. Amniotic fluid index is 1.5 cm. Impression  Cephalic presentation. Amniotic fluid index is 1.5 cm.             Assessment:     Principal Problem:     premature rupture of membranes (9/10/2020)    Active Problems:    Pregnancy (3/7/2023)      20 weeks gestation of pregnancy (3/8/2023)      Cervical cerclage suture present (3/8/2023)      Short cervix (3/8/2023)      PROM (premature rupture of membranes) (3/8/2023)        Plan:     Routine orders  Plan is to transfer once 22 weeks  BMZ injections - either at 21.6 and repeat in 24 hrs or can wait until 22 week transfer and let accepting facility give    Signed By: Lori Thomas MD     2023

## 2023-03-16 NOTE — PROGRESS NOTES
2385: Received care of Ms. Marilyn Nicolas resting in bed on left side and in a trendelenburg position awake. No acute distress noted. 0710: Shift assessment initiated and completed. Temp 98.2 and denied pain. POC reviewed. 5892: Amoxicillin 250mg given po and Erythromycin 400mg given po.     0915: Colace 100mg given po and Prenatal Vitamin tablet 1 given po as ordered. 7019: FHR noted to be 150-152 bpm along the lower abdominal quad. 1435: Amoxicillin 250mg  and Erythromycin 400mg given po.    1600: Temp 97.8 and denied pain. FHR: 141-149 using the hand held doppler. 1830: Patient remained in bed in a trendelenburg position and denied pain. 1905: Bedside report given to ISSAC Quintero RN.

## 2023-03-16 NOTE — PROGRESS NOTES
Problem: Knowledge Deficit  Goal: *Participate in the learning process  Outcome: Progressing Towards Goal  Goal: *Verbalize description of procedure  Outcome: Progressing Towards Goal  Goal: *Verbalizes understanding and describes medication purposes and frequencies  Outcome: Progressing Towards Goal  Goal: *Knowledge of discharge instructions  Outcome: Progressing Towards Goal  Goal: Interventions  Outcome: Progressing Towards Goal     Problem: Patient Education: Go to Patient Education Activity  Goal: Patient/Family Education  Outcome: Progressing Towards Goal     Problem: General Infection Care Plan (Adult and Pediatric)  Goal: Improvement in signs and symptoms of infection  Outcome: Progressing Towards Goal     Problem: Patient Education: Go to Patient Education Activity  Goal: Patient/Family Education  Outcome: Progressing Towards Goal     Problem: Premature Rupture of Membranes (PROM)  Goal: *Prevention of infection  Outcome: Progressing Towards Goal  Goal: *Reassuring fetal surveillance  Outcome: Progressing Towards Goal  Goal: *Maintain pregnancy as long as possible  Outcome: Progressing Towards Goal     Problem: Patient Education: Go to Patient Education Activity  Goal: Patient/Family Education  Outcome: Progressing Towards Goal     Problem: Pressure Injury - Risk of  Goal: *Prevention of pressure injury  Description: Document Long Scale and appropriate interventions in the flowsheet. Outcome: Progressing Towards Goal  Note: Pressure Injury Interventions: Activity Interventions:  (Advice pt in repositioning frequently)    Mobility Interventions:  (Advice pt to repostion frequently)                          Problem: Patient Education: Go to Patient Education Activity  Goal: Patient/Family Education  Outcome: Progressing Towards Goal     Problem: Falls - Risk of  Goal: *Absence of Falls  Description: Document Vlad Palomares Fall Risk and appropriate interventions in the flowsheet.   Outcome: Progressing Towards Goal  Note: Fall Risk Interventions: Place bed in low position, bed lock, side-rails up and nurses' call bell in reach, no slip socks on and patient instructed not to be out of bed without assistance                 Problem: Patient Education: Go to Patient Education Activity  Goal: Patient/Family Education  Outcome: Progressing Towards Goal

## 2023-03-17 VITALS
WEIGHT: 270 LBS | BODY MASS INDEX: 42.38 KG/M2 | HEIGHT: 67 IN | SYSTOLIC BLOOD PRESSURE: 135 MMHG | TEMPERATURE: 97.9 F | DIASTOLIC BLOOD PRESSURE: 77 MMHG | OXYGEN SATURATION: 100 % | RESPIRATION RATE: 18 BRPM | HEART RATE: 78 BPM

## 2023-03-17 LAB
BASOPHILS # BLD: 0 K/UL (ref 0–0.1)
BASOPHILS NFR BLD: 0 % (ref 0–1)
DIFFERENTIAL METHOD BLD: ABNORMAL
EOSINOPHIL # BLD: 0.1 K/UL (ref 0–0.4)
EOSINOPHIL NFR BLD: 1 % (ref 0–7)
ERYTHROCYTE [DISTWIDTH] IN BLOOD BY AUTOMATED COUNT: 14.3 % (ref 11.5–14.5)
HCT VFR BLD AUTO: 32.4 % (ref 35–47)
HGB BLD-MCNC: 10.8 G/DL (ref 11.5–16)
IMM GRANULOCYTES # BLD AUTO: 0.1 K/UL (ref 0–0.04)
IMM GRANULOCYTES NFR BLD AUTO: 1 % (ref 0–0.5)
LYMPHOCYTES # BLD: 2.9 K/UL (ref 0.8–3.5)
LYMPHOCYTES NFR BLD: 20 % (ref 12–49)
MCH RBC QN AUTO: 27.1 PG (ref 26–34)
MCHC RBC AUTO-ENTMCNC: 33.3 G/DL (ref 30–36.5)
MCV RBC AUTO: 81.2 FL (ref 80–99)
MONOCYTES # BLD: 0.8 K/UL (ref 0–1)
MONOCYTES NFR BLD: 6 % (ref 5–13)
NEUTS SEG # BLD: 10.2 K/UL (ref 1.8–8)
NEUTS SEG NFR BLD: 72 % (ref 32–75)
NRBC # BLD: 0 K/UL (ref 0–0.01)
NRBC BLD-RTO: 0 PER 100 WBC
PLATELET # BLD AUTO: 262 K/UL (ref 150–400)
PMV BLD AUTO: 10.8 FL (ref 8.9–12.9)
RBC # BLD AUTO: 3.99 M/UL (ref 3.8–5.2)
WBC # BLD AUTO: 14.1 K/UL (ref 3.6–11)

## 2023-03-17 PROCEDURE — 99238 HOSP IP/OBS DSCHRG MGMT 30/<: CPT | Performed by: OBSTETRICS & GYNECOLOGY

## 2023-03-17 PROCEDURE — 74011250637 HC RX REV CODE- 250/637: Performed by: OBSTETRICS & GYNECOLOGY

## 2023-03-17 PROCEDURE — 36415 COLL VENOUS BLD VENIPUNCTURE: CPT

## 2023-03-17 PROCEDURE — 85025 COMPLETE CBC W/AUTO DIFF WBC: CPT

## 2023-03-17 RX ADMIN — PRENATAL VIT W/ FE FUMARATE-FA TAB 27-0.8 MG 1 TABLET: 27-0.8 TAB at 08:43

## 2023-03-17 RX ADMIN — DOCUSATE SODIUM 100 MG: 100 CAPSULE, LIQUID FILLED ORAL at 08:43

## 2023-03-17 RX ADMIN — ACETAMINOPHEN 650 MG: 325 TABLET ORAL at 11:41

## 2023-03-17 RX ADMIN — ERYTHROMYCIN ETHYLSUCCINATE 400 MG: 400 TABLET ORAL at 05:39

## 2023-03-17 RX ADMIN — AMOXICILLIN 250 MG: 250 CAPSULE ORAL at 05:39

## 2023-03-17 NOTE — PROGRESS NOTES
0700: Assumed care of pt at this time. Report received from Governricky Benavidez RN. Pt asleep in bed. Respirations even and unlabored. 1122: MD spoke with M. [facility] accepting transfer of pt. Will receive call from [facility] to initiate transfer. 1134: Dr. Shawn Kelly in room discussing plan of care with pt.     1213: Call received from Transfer center states [facility] will be here in approx 1 hour to transport pt.     1215: Pt updated on plan of arrival.     1238: Called to give report to  [facility] L&D. Report given to Dulce Caballero RN.     [phone]: [facility] transport arrived to transfer pt to [address] [place] [address]. Report given to transporter    1329: Pt transferred off unit.

## 2023-03-17 NOTE — PROGRESS NOTES
High Risk Obstetrics Progress Note    Name: Anitha Donahue MRN: [id number]  SSN: [id number]    YOB: 1990  Age: 33 y.o. Sex: female      Subjective:      LOS: 9 days    Estimated Date of Delivery: 23   Gestational Age Today: 21w5d     Patient admitted for PPROM at 20w2d on 3/7/23. S: +FM. Scant blood tinged leaking of fluid. Occasional bilateral lower abdominal cramping, chronic, 5/10 pain scale. Objective:     Vitals:  Blood pressure 135/77, pulse 78, temperature 97.9 °F (36.6 °C), resp. rate 18, height 5' 7\" (1.702 m), weight 270 lb (122.5 kg), last menstrual period 2022, SpO2 100 %, not currently breastfeeding. Temp (24hrs), Av °F (36.7 °C), Min:97.6 °F (36.4 °C), Max:98.4 °F (97.8 °C)    Systolic (79SCN), IWT:196 , Min:119 , HRM:907      Diastolic (17RTR), HPM:98, Min:68, Max:84     Physical Exam:  Physical Exam  Constitutional:       General: She is awake. She is not in acute distress. Appearance: Normal appearance. She is obese. She is not ill-appearing. HENT:      Head: Normocephalic and atraumatic. Eyes:      Extraocular Movements: Extraocular movements intact. Pulmonary:      Effort: Pulmonary effort is normal.   Abdominal:      Comments: Gravid, NT   Musculoskeletal:      Cervical back: Normal range of motion. Right lower leg: No edema. Left lower leg: No edema. Neurological:      Mental Status: She is alert and oriented to person, place, and time. Skin:     General: Skin is warm and dry. Psychiatric:         Mood and Affect: Mood normal.         Behavior: Behavior normal. Behavior is cooperative.          FHR: 150s bpm  [place]: no ctxs     [place] with 5ccs of blood tinged fluid noted    Assessment and Plan:      Principal Problem:     premature rupture of membranes (9/10/2020)    Active Problems:    Pregnancy (3/7/2023)      20 weeks gestation of pregnancy (3/8/2023)      Cervical cerclage suture present (3/8/2023)      Short cervix (3/8/2023)      PROM (premature rupture of membranes) (3/8/2023)       I called MFM at [facility] earlier this morning and spoke with Dr. Jennifer Valerio. I reviewed patient's clinical case: 36-year-old -0-5-0 at 21 weeks and 5 days, TUSHAR 2023, with cervical cerclage in place due to history indicated due to history of multiple midtrimester losses, who was admitted on 3/7/2023 with  prelabor rupture of membranes. Patient is now at 21 weeks and 5 days and previous discussion with Dr. Sariah Mccallum was to call MFM to see if patient should be transferred today to a facility [facility], or transfer at 22 weeks and to discuss when to give steroids. Dr. Jennifer Valerio has since called [facility] (Dr Karla Mohr and Dr Jimmie Cárdenas) and transfer has been accepted for today and they will give the patient steroids there. I have discussed this with the nursing staff and the patient. Addendum (late entry):  Speculum exam performed prior to transport:  Cervical os closed; cerclage intact.

## 2023-03-17 NOTE — PROGRESS NOTES
1900: Report received from Sabine Stewart RN  2100: T's 160's.   0715: Report given to Rebecca La RN

## 2023-03-17 NOTE — DISCHARGE SUMMARY
Obstetrical Transfer Summary     Name: Johann Dumont MRN: [id number]  SSN: [id number]    YOB: 1990  Age: 33 y.o. Sex: female      Admit Date: 3/7/2023    Transfer Date: 3/17/2023     Admitting Physician: Shi Chávez MD     Attending Physician:  Juan Pablo Mcmillan MD     Admission Diagnoses: Pregnancy [Z34.90]; PROM (premature rupture of membranes) [O42.90]    Discharge Diagnoses: This patient has no babies on file. Additional Diagnoses:   Hospital Problems  Date Reviewed: 3/8/2023            Codes Class Noted POA    20 weeks gestation of pregnancy ICD-10-CM: Z3A.20  ICD-9-CM: V22.2  3/8/2023 Yes        Cervical cerclage suture present ICD-10-CM: O34.30  ICD-9-CM: 654.50  3/8/2023 Yes        Short cervix ICD-10-CM: N88.3  ICD-9-CM: 622.5  3/8/2023 Yes        PROM (premature rupture of membranes) ICD-10-CM: O42.90  ICD-9-CM: 658.10  3/8/2023 Unknown        Pregnancy ICD-10-CM: Z34.90  ICD-9-CM: V22.2  3/7/2023 Yes        * (Principal)  premature rupture of membranes ICD-10-CM: O42.919  ICD-9-CM: 658.10  9/10/2020 Yes        No results found for: RUBELLAEXT, GRBSEXT, RUBELLAEXT, GRBSEXT    Recent Results (from the past 24 hour(s))   CBC WITH AUTOMATED DIFF    Collection Time: 23 11:14 AM   Result Value Ref Range    WBC 14.1 (H) 3.6 - 11.0 K/uL    RBC 3.99 3.80 - 5.20 M/uL    HGB 10.8 (L) 11.5 - 16.0 g/dL    HCT 32.4 (L) 35.0 - 47.0 %    MCV 81.2 80.0 - 99.0 FL    MCH 27.1 26.0 - 34.0 PG    MCHC 33.3 30.0 - 36.5 g/dL    RDW 14.3 11.5 - 14.5 %    PLATELET 805 602 - 863 K/uL    MPV 10.8 8.9 - 12.9 FL    NRBC 0.0 0.0  WBC    ABSOLUTE NRBC 0.00 0.00 - 0.01 K/uL    NEUTROPHILS 72 32 - 75 %    LYMPHOCYTES 20 12 - 49 %    MONOCYTES 6 5 - 13 %    EOSINOPHILS 1 0 - 7 %    BASOPHILS 0 0 - 1 %    IMMATURE GRANULOCYTES 1 (H) 0 - 0.5 %    ABS. NEUTROPHILS 10.2 (H) 1.8 - 8.0 K/UL    ABS. LYMPHOCYTES 2.9 0.8 - 3.5 K/UL    ABS. MONOCYTES 0.8 0.0 - 1.0 K/UL    ABS.  EOSINOPHILS 0.1 0.0 - 0.4 K/UL    ABS. BASOPHILS 0.0 0.0 - 0.1 K/UL    ABS. IMM. GRANS. 0.1 (H) 0.00 - 0.04 K/UL    DF AUTOMATED        OB ultrasound 3/7/23:  INDICATION: possible ROM     COMPARISON: Ultrasound 2023 and 2022     EXAM: Real-time transabdominal pelvic ultrasound of the gravid uterus. FINDINGS: A single intrauterine pregnancy is shown. There is substantial  oligohydramnios without definable fluid. Measurement. MANDO is estimated at 0.0  cm. Cervical length is 4 cm. The placenta is anterior in location and shows  uniform echotexture. Fetal position is vertex with fetal cardiac activity  measuring 165 bpm. Estimated gestational age by measurement of biparietal  diameter, head circumference, abdominal circumference and femur length ranges  from 18 weeks 5 days +/- 2 weeks 1 day with abdominal circumference to 19 weeks,  3 days and 19 weeks, 6 days with measurement of femur length head circumference,  and 20 weeks, 3 days with measurement of biparietal diameter. Ovaries are not  identified, obscured by bowel gas. IMPRESSION  Viable intrauterine pregnancy with oligohydramnios. -------------------------  EXAM:  [facility] 3/15/23     INDICATION: Amniotic fluid index and positioning     COMPARISON: 2023. TECHNIQUE: Limited transabdominal pelvic sonography. FINDINGS: Single live intrauterine gestation in cephalic presentation. Heart  rate is 139 bpm. Amniotic fluid index is 1.5 cm. IMPRESSION  Cephalic presentation. Amniotic fluid index is 1.5 cm. Hospital Course: 81-year-old -0-5-0 at 21 weeks and 5 days, TUSHAR 2023, with history indicated cervical cerclage in place due to history of multiple midtrimester losses, who was admitted on 3/7/2023 with  prelabor rupture of membranes after presenting with complaint of leaking of fluid. Lab work and ultrasound confirmed the diagnosis with no measurable pocket of fluid on 3/7/2023.  Cervix appeared fingertip on speculum exam, cerclage was in place and not under tension. Patient was admitted. Discussion was had with maternal-fetal medicine at [facility] and they agreed with plan for PPROM antibiotics. Plan was then to initiate transfer between 21 weeks and 5 days to 22 weeks and 5 days with a facility that is able to resuscitate at 22 weeks. She received 2 days of IV azithromycin and Ampicillin and then converted to po amoxicillin and erythromycin. Patient remained in the hospital and course was uncomplicated. Serial labs were performed and no signs of labor or infection were noted. Ultrasound on 3/15/2023 showed MANDO of 1.5 cm. [facility] was called today and case reviewed. They were able to initiate transfer to [facility] for today at 21 weeks and 5 days. The receiving facility will administer betamethasone and can resuscitate as early as 22 weeks. Disposition: Transfer to [facility]  Condition: Good        No orders of the defined types were placed in this encounter.        Signed By:  Erin Flores MD     March 17, 2023

## 2023-03-22 ENCOUNTER — TELEPHONE (OUTPATIENT)
Dept: OBGYN CLINIC | Age: 33
End: 2023-03-22

## 2023-03-22 NOTE — TELEPHONE ENCOUNTER
Spoke with Dr Sana Coronado and advised her the patient delivered at 22 weeks and 2 days at [address] [place] [address] but the baby didn't survive. The patient was discharged today.

## 2023-04-12 ENCOUNTER — OFFICE VISIT (OUTPATIENT)
Dept: OBGYN CLINIC | Age: 33
End: 2023-04-12
Payer: MEDICAID

## 2023-04-12 VITALS
BODY MASS INDEX: 43 KG/M2 | OXYGEN SATURATION: 97 % | WEIGHT: 274 LBS | DIASTOLIC BLOOD PRESSURE: 84 MMHG | HEART RATE: 81 BPM | HEIGHT: 67 IN | SYSTOLIC BLOOD PRESSURE: 119 MMHG | RESPIRATION RATE: 18 BRPM

## 2023-04-12 DIAGNOSIS — Z76.89 ENCOUNTER FOR MENSTRUAL REGULATION: ICD-10-CM

## 2023-04-12 DIAGNOSIS — N76.0 VAGINITIS AND VULVOVAGINITIS: ICD-10-CM

## 2023-04-12 PROCEDURE — 0503F POSTPARTUM CARE VISIT: CPT | Performed by: OBSTETRICS & GYNECOLOGY

## 2023-04-12 RX ORDER — FLUCONAZOLE 150 MG/1
150 TABLET ORAL DAILY
Qty: 1 TABLET | Refills: 0 | Status: SHIPPED | OUTPATIENT
Start: 2023-04-12 | End: 2023-04-13

## 2023-04-12 RX ORDER — NORGESTIMATE AND ETHINYL ESTRADIOL 0.25-0.035
1 KIT ORAL DAILY
Qty: 1 DOSE PACK | Refills: 11 | Status: SHIPPED | OUTPATIENT
Start: 2023-04-12

## 2023-04-22 DIAGNOSIS — N91.2 AMENORRHEA: Primary | ICD-10-CM

## 2023-05-25 RX ORDER — NORGESTIMATE AND ETHINYL ESTRADIOL 0.25-0.035
1 KIT ORAL DAILY
COMMUNITY
Start: 2023-04-12

## 2023-07-20 ENCOUNTER — HOSPITAL ENCOUNTER (EMERGENCY)
Facility: HOSPITAL | Age: 33
Discharge: HOME | End: 2023-07-20
Attending: STUDENT IN AN ORGANIZED HEALTH CARE EDUCATION/TRAINING PROGRAM
Payer: MEDICAID

## 2023-07-20 VITALS
SYSTOLIC BLOOD PRESSURE: 141 MMHG | RESPIRATION RATE: 16 BRPM | DIASTOLIC BLOOD PRESSURE: 86 MMHG | BODY MASS INDEX: 43.16 KG/M2 | HEART RATE: 89 BPM | WEIGHT: 275 LBS | OXYGEN SATURATION: 99 % | HEIGHT: 67 IN | TEMPERATURE: 98.6 F

## 2023-07-20 DIAGNOSIS — R03.0 ELEVATED BLOOD PRESSURE READING: ICD-10-CM

## 2023-07-20 DIAGNOSIS — J03.90 ACUTE TONSILLITIS, UNSPECIFIED ETIOLOGY: Primary | ICD-10-CM

## 2023-07-20 LAB — DEPRECATED S PYO AG THROAT QL EIA: NEGATIVE

## 2023-07-20 PROCEDURE — 96360 HYDRATION IV INFUSION INIT: CPT

## 2023-07-20 PROCEDURE — 6360000002 HC RX W HCPCS: Performed by: STUDENT IN AN ORGANIZED HEALTH CARE EDUCATION/TRAINING PROGRAM

## 2023-07-20 PROCEDURE — 6370000000 HC RX 637 (ALT 250 FOR IP): Performed by: STUDENT IN AN ORGANIZED HEALTH CARE EDUCATION/TRAINING PROGRAM

## 2023-07-20 PROCEDURE — 87070 CULTURE OTHR SPECIMN AEROBIC: CPT

## 2023-07-20 PROCEDURE — 87147 CULTURE TYPE IMMUNOLOGIC: CPT

## 2023-07-20 PROCEDURE — 2580000003 HC RX 258: Performed by: STUDENT IN AN ORGANIZED HEALTH CARE EDUCATION/TRAINING PROGRAM

## 2023-07-20 PROCEDURE — 87880 STREP A ASSAY W/OPTIC: CPT

## 2023-07-20 PROCEDURE — 99284 EMERGENCY DEPT VISIT MOD MDM: CPT

## 2023-07-20 RX ORDER — 0.9 % SODIUM CHLORIDE 0.9 %
1000 INTRAVENOUS SOLUTION INTRAVENOUS ONCE
Status: COMPLETED | OUTPATIENT
Start: 2023-07-20 | End: 2023-07-20

## 2023-07-20 RX ORDER — ACETAMINOPHEN 500 MG
1000 TABLET ORAL
Status: COMPLETED | OUTPATIENT
Start: 2023-07-20 | End: 2023-07-20

## 2023-07-20 RX ORDER — DEXAMETHASONE SODIUM PHOSPHATE 10 MG/ML
8 INJECTION, SOLUTION INTRAMUSCULAR; INTRAVENOUS ONCE
Status: COMPLETED | OUTPATIENT
Start: 2023-07-20 | End: 2023-07-20

## 2023-07-20 RX ORDER — CEPHALEXIN 500 MG/1
500 CAPSULE ORAL 2 TIMES DAILY
Qty: 20 CAPSULE | Refills: 0 | Status: SHIPPED | OUTPATIENT
Start: 2023-07-20 | End: 2023-07-30

## 2023-07-20 RX ADMIN — ACETAMINOPHEN 1000 MG: 500 TABLET ORAL at 03:16

## 2023-07-20 RX ADMIN — SODIUM CHLORIDE 1000 ML: 9 INJECTION, SOLUTION INTRAVENOUS at 03:17

## 2023-07-20 RX ADMIN — DEXAMETHASONE SODIUM PHOSPHATE 8 MG: 10 INJECTION, SOLUTION INTRAMUSCULAR; INTRAVENOUS at 03:46

## 2023-07-20 ASSESSMENT — LIFESTYLE VARIABLES
HOW MANY STANDARD DRINKS CONTAINING ALCOHOL DO YOU HAVE ON A TYPICAL DAY: PATIENT DOES NOT DRINK
HOW OFTEN DO YOU HAVE A DRINK CONTAINING ALCOHOL: NEVER

## 2023-07-20 ASSESSMENT — PAIN DESCRIPTION - LOCATION
LOCATION: THROAT
LOCATION: THROAT

## 2023-07-20 ASSESSMENT — PAIN - FUNCTIONAL ASSESSMENT
PAIN_FUNCTIONAL_ASSESSMENT: 0-10
PAIN_FUNCTIONAL_ASSESSMENT: 0-10

## 2023-07-20 ASSESSMENT — PAIN SCALES - GENERAL
PAINLEVEL_OUTOF10: 5
PAINLEVEL_OUTOF10: 8

## 2023-07-21 LAB
BACTERIA SPEC CULT: ABNORMAL
BACTERIA SPEC CULT: ABNORMAL
Lab: ABNORMAL

## 2023-11-28 ENCOUNTER — HOSPITAL ENCOUNTER (EMERGENCY)
Facility: HOSPITAL | Age: 33
Discharge: HOME | End: 2023-11-28
Payer: MEDICAID

## 2023-11-28 ENCOUNTER — APPOINTMENT (OUTPATIENT)
Facility: HOSPITAL | Age: 33
End: 2023-11-28
Payer: MEDICAID

## 2023-11-28 VITALS
OXYGEN SATURATION: 100 % | RESPIRATION RATE: 17 BRPM | SYSTOLIC BLOOD PRESSURE: 134 MMHG | DIASTOLIC BLOOD PRESSURE: 90 MMHG | TEMPERATURE: 99.9 F | BODY MASS INDEX: 37.35 KG/M2 | HEART RATE: 85 BPM | WEIGHT: 238 LBS | HEIGHT: 67 IN

## 2023-11-28 DIAGNOSIS — J10.1 INFLUENZA B: Primary | ICD-10-CM

## 2023-11-28 LAB
FLUAV RNA SPEC QL NAA+PROBE: NOT DETECTED
FLUBV RNA SPEC QL NAA+PROBE: DETECTED
SARS-COV-2 RNA RESP QL NAA+PROBE: NOT DETECTED

## 2023-11-28 PROCEDURE — 2580000003 HC RX 258: Performed by: PHYSICIAN ASSISTANT

## 2023-11-28 PROCEDURE — 87636 SARSCOV2 & INF A&B AMP PRB: CPT

## 2023-11-28 PROCEDURE — 93005 ELECTROCARDIOGRAM TRACING: CPT | Performed by: STUDENT IN AN ORGANIZED HEALTH CARE EDUCATION/TRAINING PROGRAM

## 2023-11-28 PROCEDURE — 99285 EMERGENCY DEPT VISIT HI MDM: CPT

## 2023-11-28 PROCEDURE — 96360 HYDRATION IV INFUSION INIT: CPT

## 2023-11-28 PROCEDURE — 96361 HYDRATE IV INFUSION ADD-ON: CPT

## 2023-11-28 PROCEDURE — 6370000000 HC RX 637 (ALT 250 FOR IP): Performed by: PHYSICIAN ASSISTANT

## 2023-11-28 PROCEDURE — 71046 X-RAY EXAM CHEST 2 VIEWS: CPT

## 2023-11-28 RX ORDER — DEXTROMETHORPHAN HYDROBROMIDE AND PROMETHAZINE HYDROCHLORIDE 15; 6.25 MG/5ML; MG/5ML
2.5 SYRUP ORAL 4 TIMES DAILY PRN
Qty: 70 ML | Refills: 0 | Status: SHIPPED | OUTPATIENT
Start: 2023-11-28 | End: 2023-12-05

## 2023-11-28 RX ORDER — ONDANSETRON 4 MG/1
4 TABLET, ORALLY DISINTEGRATING ORAL EVERY 8 HOURS PRN
Qty: 12 TABLET | Refills: 0 | Status: SHIPPED | OUTPATIENT
Start: 2023-11-28

## 2023-11-28 RX ORDER — IBUPROFEN 800 MG/1
800 TABLET ORAL
Status: COMPLETED | OUTPATIENT
Start: 2023-11-28 | End: 2023-11-28

## 2023-11-28 RX ORDER — IBUPROFEN 800 MG/1
800 TABLET ORAL EVERY 8 HOURS PRN
Qty: 20 TABLET | Refills: 0 | Status: SHIPPED | OUTPATIENT
Start: 2023-11-28

## 2023-11-28 RX ORDER — 0.9 % SODIUM CHLORIDE 0.9 %
1000 INTRAVENOUS SOLUTION INTRAVENOUS
Status: COMPLETED | OUTPATIENT
Start: 2023-11-28 | End: 2023-11-28

## 2023-11-28 RX ORDER — ACETAMINOPHEN 500 MG
1000 TABLET ORAL 4 TIMES DAILY PRN
Qty: 40 TABLET | Refills: 0 | Status: SHIPPED | OUTPATIENT
Start: 2023-11-28

## 2023-11-28 RX ADMIN — SODIUM CHLORIDE 1000 ML: 9 INJECTION, SOLUTION INTRAVENOUS at 11:52

## 2023-11-28 RX ADMIN — IBUPROFEN 800 MG: 800 TABLET, FILM COATED ORAL at 12:35

## 2023-11-28 ASSESSMENT — PAIN - FUNCTIONAL ASSESSMENT: PAIN_FUNCTIONAL_ASSESSMENT: NONE - DENIES PAIN

## 2023-11-28 NOTE — ED TRIAGE NOTES
Pt has had a cough, chills, no taste, and dizziness for two days. States she feels like she's going to fall when she ambulates. Pt has been taking nyquil, dayquil and tylenol.

## 2023-11-28 NOTE — ED PROVIDER NOTES
Interval 138 ms    QRS Duration 84 ms    Q-T Interval 344 ms    QTc Calculation (Bazett) 446 ms    P Axis 49 degrees    R Axis 38 degrees    T Axis 8 degrees    Diagnosis       Sinus tachycardia  Possible Left atrial enlargement  Nonspecific T wave abnormality  Abnormal ECG  When compared with ECG of 05-MAY-2020 02:44,  ST no longer elevated in Inferior leads  ST no longer elevated in Lateral leads  Nonspecific T wave abnormality now evident in Inferior leads  Nonspecific T wave abnormality now evident in Anterolateral leads     COVID-19 & Influenza Combo    Collection Time: 11/28/23 11:39 AM    Specimen: Nasopharyngeal   Result Value Ref Range    SARS-CoV-2, PCR Not Detected Not Detected      Rapid Influenza A By PCR Not Detected Not Detected      Rapid Influenza B By PCR DETECTED (A) Not Detected         EKG: EKG interpreted by me. Shows Sinus tachycardia rhythm with a HR of 101. No ST elevations or depressions concerning for ischemia. Normal intervals. Anthony Banegas PA-C      Radiologic Studies:  Non-plain film images such as CT, Ultrasound and MRI are read by the radiologist. Plain radiographic images are visualized and preliminarily interpreted by the ED Physician with the following findings: See ED Course Below    Interpretation per the Radiologist below, if available at the time of this note:  XR CHEST (2 VW)   Final Result      No evidence of acute process. ED COURSE and DIFFERENTIAL DIAGNOSIS/MDM   CC/HPI Summary, DDx, ED Course, and Reassessment:   Pt presents with acute URI symptoms including nasal congestion, rhinorrhea and sore throat. Pt also has c/o of cough without dyspnea, chest pain or wheezing. Pt is well-appearing with stable vitals and benign exam; symptoms are consistent with an uncomplicated URI. DDx: COVID-19, acute bronchitis, bacterial sinusitis vs. pharyngitis, migraine, flu.      Symptomatic therapy suggested: acetaminophen, ibuprofen, antihistamine-decongestant of

## 2023-11-30 LAB
EKG ATRIAL RATE: 101 BPM
EKG DIAGNOSIS: NORMAL
EKG P AXIS: 49 DEGREES
EKG P-R INTERVAL: 138 MS
EKG Q-T INTERVAL: 344 MS
EKG QRS DURATION: 84 MS
EKG QTC CALCULATION (BAZETT): 446 MS
EKG R AXIS: 38 DEGREES
EKG T AXIS: 8 DEGREES
EKG VENTRICULAR RATE: 101 BPM

## 2024-05-13 DIAGNOSIS — N94.89 SUPPRESSION OF MENSES: Primary | ICD-10-CM

## 2024-05-13 RX ORDER — NORGESTIMATE AND ETHINYL ESTRADIOL 0.25-0.035
1 KIT ORAL DAILY
Qty: 28 TABLET | Refills: 0 | Status: SHIPPED | OUTPATIENT
Start: 2024-05-13

## 2024-07-10 ENCOUNTER — TELEPHONE (OUTPATIENT)
Age: 34
End: 2024-07-10

## 2024-07-10 DIAGNOSIS — N94.89 SUPPRESSION OF MENSES: ICD-10-CM

## 2024-07-10 RX ORDER — NORGESTIMATE AND ETHINYL ESTRADIOL 0.25-0.035
1 KIT ORAL DAILY
Qty: 28 TABLET | Refills: 0 | Status: SHIPPED | OUTPATIENT
Start: 2024-07-10

## 2024-07-10 NOTE — TELEPHONE ENCOUNTER
Patient called requesting a refill on medication and to have her annual exam rescheduled.  Appointment rescheduled for 08/09/24.  Refill submitted.

## 2024-08-19 DIAGNOSIS — N94.89 SUPPRESSION OF MENSES: ICD-10-CM

## 2024-08-19 RX ORDER — NORGESTIMATE AND ETHINYL ESTRADIOL 0.25-0.035
1 KIT ORAL DAILY
Qty: 28 TABLET | Refills: 0 | OUTPATIENT
Start: 2024-08-19

## 2024-08-20 ENCOUNTER — TELEPHONE (OUTPATIENT)
Age: 34
End: 2024-08-20

## 2024-08-20 DIAGNOSIS — N94.89 SUPPRESSION OF MENSES: Primary | ICD-10-CM

## 2024-08-20 RX ORDER — NORGESTIMATE AND ETHINYL ESTRADIOL 0.25-0.035
1 KIT ORAL DAILY
Qty: 1 PACKET | Refills: 0 | Status: SHIPPED | OUTPATIENT
Start: 2024-08-20

## 2024-08-20 NOTE — TELEPHONE ENCOUNTER
Patient called requesting a refill on OCPs.  She has been scheduled for her annual exam on 09/12/24 in the [facility].  Refill submitted to her pharmacy.

## 2024-09-12 ENCOUNTER — OFFICE VISIT (OUTPATIENT)
Age: 34
End: 2024-09-12
Payer: MEDICAID

## 2024-09-12 VITALS
DIASTOLIC BLOOD PRESSURE: 92 MMHG | SYSTOLIC BLOOD PRESSURE: 174 MMHG | OXYGEN SATURATION: 97 % | HEIGHT: 67 IN | WEIGHT: 262 LBS | BODY MASS INDEX: 41.12 KG/M2 | HEART RATE: 72 BPM | RESPIRATION RATE: 18 BRPM

## 2024-09-12 DIAGNOSIS — Z01.419 GYNECOLOGIC EXAM NORMAL: Primary | ICD-10-CM

## 2024-09-12 DIAGNOSIS — Z12.4 ENCOUNTER FOR SCREENING FOR MALIGNANT NEOPLASM OF CERVIX: ICD-10-CM

## 2024-09-12 PROCEDURE — 99395 PREV VISIT EST AGE 18-39: CPT | Performed by: OBSTETRICS & GYNECOLOGY

## 2024-09-12 SDOH — ECONOMIC STABILITY: INCOME INSECURITY: HOW HARD IS IT FOR YOU TO PAY FOR THE VERY BASICS LIKE FOOD, HOUSING, MEDICAL CARE, AND HEATING?: NOT HARD AT ALL

## 2024-09-12 SDOH — ECONOMIC STABILITY: FOOD INSECURITY: WITHIN THE PAST 12 MONTHS, YOU WORRIED THAT YOUR FOOD WOULD RUN OUT BEFORE YOU GOT MONEY TO BUY MORE.: NEVER TRUE

## 2024-09-12 SDOH — ECONOMIC STABILITY: FOOD INSECURITY: WITHIN THE PAST 12 MONTHS, THE FOOD YOU BOUGHT JUST DIDN'T LAST AND YOU DIDN'T HAVE MONEY TO GET MORE.: NEVER TRUE

## 2024-09-12 ASSESSMENT — PATIENT HEALTH QUESTIONNAIRE - PHQ9
2. FEELING DOWN, DEPRESSED OR HOPELESS: NOT AT ALL
SUM OF ALL RESPONSES TO PHQ QUESTIONS 1-9: 0
SUM OF ALL RESPONSES TO PHQ QUESTIONS 1-9: 0
SUM OF ALL RESPONSES TO PHQ9 QUESTIONS 1 & 2: 0
SUM OF ALL RESPONSES TO PHQ QUESTIONS 1-9: 0
SUM OF ALL RESPONSES TO PHQ QUESTIONS 1-9: 0
1. LITTLE INTEREST OR PLEASURE IN DOING THINGS: NOT AT ALL

## 2024-09-12 ASSESSMENT — ANXIETY QUESTIONNAIRES
4. TROUBLE RELAXING: NOT AT ALL
GAD7 TOTAL SCORE: 0
7. FEELING AFRAID AS IF SOMETHING AWFUL MIGHT HAPPEN: NOT AT ALL
3. WORRYING TOO MUCH ABOUT DIFFERENT THINGS: NOT AT ALL
1. FEELING NERVOUS, ANXIOUS, OR ON EDGE: NOT AT ALL
IF YOU CHECKED OFF ANY PROBLEMS ON THIS QUESTIONNAIRE, HOW DIFFICULT HAVE THESE PROBLEMS MADE IT FOR YOU TO DO YOUR WORK, TAKE CARE OF THINGS AT HOME, OR GET ALONG WITH OTHER PEOPLE: NOT DIFFICULT AT ALL
5. BEING SO RESTLESS THAT IT IS HARD TO SIT STILL: NOT AT ALL
2. NOT BEING ABLE TO STOP OR CONTROL WORRYING: NOT AT ALL
6. BECOMING EASILY ANNOYED OR IRRITABLE: NOT AT ALL

## 2024-09-16 LAB
., LABCORP: NORMAL
CYTOLOGIST CVX/VAG CYTO: NORMAL
CYTOLOGY CVX/VAG DOC CYTO: NORMAL
CYTOLOGY CVX/VAG DOC THIN PREP: NORMAL
DX ICD CODE: NORMAL
Lab: NORMAL
OTHER STN SPEC: NORMAL
STAT OF ADQ CVX/VAG CYTO-IMP: NORMAL

## 2024-09-23 DIAGNOSIS — N94.89 SUPPRESSION OF MENSES: ICD-10-CM

## 2024-09-23 RX ORDER — NORGESTIMATE AND ETHINYL ESTRADIOL 0.25-0.035
1 KIT ORAL DAILY
Qty: 28 TABLET | Refills: 0 | OUTPATIENT
Start: 2024-09-23

## 2024-09-23 RX ORDER — NORGESTIMATE AND ETHINYL ESTRADIOL 0.25-0.035
1 KIT ORAL DAILY
Qty: 28 TABLET | Refills: 11 | Status: SHIPPED | OUTPATIENT
Start: 2024-09-23